# Patient Record
Sex: MALE | Race: BLACK OR AFRICAN AMERICAN | URBAN - METROPOLITAN AREA
[De-identification: names, ages, dates, MRNs, and addresses within clinical notes are randomized per-mention and may not be internally consistent; named-entity substitution may affect disease eponyms.]

---

## 2021-09-14 ENCOUNTER — OFFICE VISIT (OUTPATIENT)
Dept: PEDIATRICS CLINIC | Age: 66
End: 2021-09-14
Payer: MEDICARE

## 2021-09-14 VITALS
SYSTOLIC BLOOD PRESSURE: 100 MMHG | HEIGHT: 72 IN | TEMPERATURE: 97.4 F | DIASTOLIC BLOOD PRESSURE: 60 MMHG | HEART RATE: 67 BPM | BODY MASS INDEX: 17.34 KG/M2 | WEIGHT: 128 LBS

## 2021-09-14 DIAGNOSIS — S61.215A LACERATION OF LEFT RING FINGER WITHOUT FOREIGN BODY, NAIL DAMAGE STATUS UNSPECIFIED, INITIAL ENCOUNTER: Primary | ICD-10-CM

## 2021-09-14 PROBLEM — Z86.010 HISTORY OF COLONIC POLYPS: Status: ACTIVE | Noted: 2021-09-14

## 2021-09-14 PROBLEM — F10.20 OTHER AND UNSPECIFIED ALCOHOL DEPENDENCE, UNSPECIFIED DRINKING BEHAVIOR: Status: ACTIVE | Noted: 2021-09-14

## 2021-09-14 PROBLEM — E53.8 VITAMIN B12 DEFICIENCY (NON ANEMIC): Status: ACTIVE | Noted: 2021-09-14

## 2021-09-14 PROBLEM — F17.200 NICOTINE DEPENDENCE: Status: ACTIVE | Noted: 2021-09-14

## 2021-09-14 PROBLEM — D72.10 EOSINOPHILIA: Status: ACTIVE | Noted: 2021-09-14

## 2021-09-14 PROBLEM — I65.29 CAROTID ARTERY STENOSIS: Status: ACTIVE | Noted: 2021-09-14

## 2021-09-14 PROBLEM — E78.5 HYPERLIPIDEMIA: Status: ACTIVE | Noted: 2021-09-14

## 2021-09-14 PROBLEM — Z86.0100 HISTORY OF COLONIC POLYPS: Status: ACTIVE | Noted: 2021-09-14

## 2021-09-14 PROCEDURE — 99202 OFFICE O/P NEW SF 15 MIN: CPT | Performed by: NURSE PRACTITIONER

## 2021-09-14 ASSESSMENT — ENCOUNTER SYMPTOMS
WHEEZING: 0
SHORTNESS OF BREATH: 0
COUGH: 0

## 2021-10-21 ENCOUNTER — OFFICE VISIT (OUTPATIENT)
Dept: PEDIATRICS CLINIC | Age: 66
End: 2021-10-21
Payer: MEDICARE

## 2021-10-21 VITALS
HEIGHT: 72 IN | OXYGEN SATURATION: 93 % | SYSTOLIC BLOOD PRESSURE: 120 MMHG | WEIGHT: 124 LBS | TEMPERATURE: 96.4 F | BODY MASS INDEX: 16.8 KG/M2 | HEART RATE: 86 BPM | DIASTOLIC BLOOD PRESSURE: 70 MMHG

## 2021-10-21 DIAGNOSIS — R42 ORTHOSTATIC DIZZINESS: Primary | ICD-10-CM

## 2021-10-21 DIAGNOSIS — E78.5 HYPERLIPIDEMIA, UNSPECIFIED HYPERLIPIDEMIA TYPE: ICD-10-CM

## 2021-10-21 PROCEDURE — 99213 OFFICE O/P EST LOW 20 MIN: CPT | Performed by: NURSE PRACTITIONER

## 2021-11-05 ASSESSMENT — ENCOUNTER SYMPTOMS
ABDOMINAL PAIN: 0
COUGH: 0
SORE THROAT: 0
EYE PAIN: 0
VISUAL CHANGE: 0
CHEST TIGHTNESS: 0
NAUSEA: 0
EYE ITCHING: 0
SHORTNESS OF BREATH: 0
WHEEZING: 0
SWOLLEN GLANDS: 0
VOMITING: 0
EYE DISCHARGE: 0
CHANGE IN BOWEL HABIT: 0
RHINORRHEA: 0

## 2021-11-05 ASSESSMENT — VISUAL ACUITY: OU: 1

## 2021-11-05 NOTE — PROGRESS NOTES
Subjective:      Patient ID: Ryley Andino is a 73 y.o. male who present today with:      Chief Complaint   Patient presents with    Dizziness     Dizziness  This is a new problem. The current episode started 1 to 4 weeks ago. The problem occurs intermittently. The problem has been unchanged. Pertinent negatives include no abdominal pain, anorexia, arthralgias, change in bowel habit, chest pain, chills, congestion, coughing, diaphoresis, fatigue, fever, headaches, joint swelling, myalgias, nausea, neck pain, numbness, rash, sore throat, swollen glands, urinary symptoms, vertigo, visual change, vomiting or weakness. Exacerbated by: Changing positions: laying to sitting, sitting to standing. He has tried nothing for the symptoms. No past medical history on file. Patient Active Problem List    Diagnosis Date Noted    Carotid artery stenosis 09/14/2021    Eosinophilia 09/14/2021    History of colonic polyps 09/14/2021    Hyperlipidemia 09/14/2021    Nicotine dependence 09/14/2021    Other and unspecified alcohol dependence, unspecified drinking behavior 09/14/2021    Vitamin B12 deficiency (non anemic) 09/14/2021     No past surgical history on file.   Social History     Socioeconomic History    Marital status: Single     Spouse name: None    Number of children: None    Years of education: None    Highest education level: None   Occupational History    None   Tobacco Use    Smoking status: Current Every Day Smoker     Types: Cigarettes    Smokeless tobacco: Never Used   Substance and Sexual Activity    Alcohol use: None    Drug use: None    Sexual activity: None   Other Topics Concern    None   Social History Narrative    None     Social Determinants of Health     Financial Resource Strain:     Difficulty of Paying Living Expenses:    Food Insecurity:     Worried About Running Out of Food in the Last Year:     Haylee of Food in the Last Year:    Transportation Needs:     Lack of Transportation (Medical):  Lack of Transportation (Non-Medical):    Physical Activity:     Days of Exercise per Week:     Minutes of Exercise per Session:    Stress:     Feeling of Stress :    Social Connections:     Frequency of Communication with Friends and Family:     Frequency of Social Gatherings with Friends and Family:     Attends Caodaism Services:     Active Member of Clubs or Organizations:     Attends Club or Organization Meetings:     Marital Status:    Intimate Partner Violence:     Fear of Current or Ex-Partner:     Emotionally Abused:     Physically Abused:     Sexually Abused:      No current outpatient medications on file prior to visit. No current facility-administered medications on file prior to visit. No Known Allergies     Review of Systems   Constitutional: Negative for chills, diaphoresis, fatigue and fever. HENT: Negative for congestion, ear pain, mouth sores, rhinorrhea and sore throat. Eyes: Negative for pain, discharge and itching. Respiratory: Negative for cough, chest tightness, shortness of breath and wheezing. Cardiovascular: Negative for chest pain. Gastrointestinal: Negative for abdominal pain, anorexia, change in bowel habit, nausea and vomiting. Musculoskeletal: Negative for arthralgias, joint swelling, myalgias and neck pain. Skin: Negative for rash. Neurological: Positive for dizziness. Negative for vertigo, weakness, numbness and headaches. Objective:      Vitals:    10/21/21 1003   BP: 120/70   Site: Left Upper Arm   Position: Sitting   Cuff Size: Medium Adult   Pulse: 86   Temp: 96.4 °F (35.8 °C)   TempSrc: Temporal   SpO2: 93%   Weight: 124 lb (56.2 kg)   Height: 6' (1.829 m)     Physical Exam  Constitutional:       General: He is not in acute distress. Appearance: Normal appearance. He is not ill-appearing. HENT:      Head: Normocephalic and atraumatic.       Right Ear: Hearing, tympanic membrane, ear canal and external ear normal.      Left Ear: Hearing, tympanic membrane, ear canal and external ear normal.      Nose: Nose normal.      Right Sinus: No maxillary sinus tenderness or frontal sinus tenderness. Left Sinus: No maxillary sinus tenderness or frontal sinus tenderness. Mouth/Throat:      Lips: Pink. Mouth: Mucous membranes are moist.      Pharynx: Oropharynx is clear. Uvula midline. Tonsils: No tonsillar exudate. Eyes:      General: Lids are normal. Vision grossly intact. Extraocular Movements: Extraocular movements intact. Conjunctiva/sclera: Conjunctivae normal.      Pupils: Pupils are equal, round, and reactive to light. Cardiovascular:      Rate and Rhythm: Normal rate and regular rhythm. Heart sounds: Normal heart sounds. Pulmonary:      Effort: Pulmonary effort is normal.      Breath sounds: Normal breath sounds and air entry. Abdominal:      General: Abdomen is flat. Bowel sounds are normal.      Palpations: Abdomen is soft. Tenderness: There is no abdominal tenderness. There is no right CVA tenderness, left CVA tenderness, guarding or rebound. Musculoskeletal:      Comments: Passive and active ROM WNL. Lymphadenopathy:      Cervical: No cervical adenopathy. Skin:     General: Skin is warm and dry. Findings: No rash. Neurological:      General: No focal deficit present. Mental Status: He is alert. Deep Tendon Reflexes: Reflexes are normal and symmetric. Assessment & Plan:     Abel Robles was seen today for dizziness. Diagnoses and all orders for this visit:    Orthostatic dizziness  -     CBC With Auto Differential; Future  -     Comprehensive Metabolic Panel; Future  -     Vitamin B12 & Folate; Future  -     Iron And Tibc; Future  -     Ferritin; Future    Hyperlipidemia, unspecified hyperlipidemia type  -     Lipid Panel;  Future      Side effects, adverse effects of the medication prescribed today, as well as treatment plan/ rationale and result expectations have been discussed with the patient who expresses understanding and desires to proceed. Close follow up to evaluate treatment results and for coordination of care. I have reviewed the patient's medical history in detail and updated the computerized patient record. As always, patient is advised that if symptoms worsen in any way they will proceed to the nearest emergency room. Follow up in 1 month and as needed.     Angus Meza, APRN - CNP

## 2023-07-06 ENCOUNTER — APPOINTMENT (OUTPATIENT)
Dept: CT IMAGING | Age: 68
End: 2023-07-06
Payer: COMMERCIAL

## 2023-07-06 ENCOUNTER — APPOINTMENT (OUTPATIENT)
Dept: GENERAL RADIOLOGY | Age: 68
End: 2023-07-06
Payer: COMMERCIAL

## 2023-07-06 ENCOUNTER — HOSPITAL ENCOUNTER (OUTPATIENT)
Age: 68
Setting detail: OBSERVATION
Discharge: HOME | End: 2023-07-07
Attending: INTERNAL MEDICINE | Admitting: INTERNAL MEDICINE
Payer: COMMERCIAL

## 2023-07-06 DIAGNOSIS — D64.9 ANEMIA, UNSPECIFIED TYPE: ICD-10-CM

## 2023-07-06 DIAGNOSIS — E80.6 HYPERBILIRUBINEMIA: ICD-10-CM

## 2023-07-06 DIAGNOSIS — R94.31 ABNORMAL EKG: ICD-10-CM

## 2023-07-06 DIAGNOSIS — I95.1 ORTHOSTATIC HYPOTENSION: ICD-10-CM

## 2023-07-06 DIAGNOSIS — R55 SYNCOPE AND COLLAPSE: Primary | ICD-10-CM

## 2023-07-06 LAB
ALBUMIN SERPL-MCNC: 3.3 G/DL (ref 3.5–4.6)
ALBUMIN SERPL-MCNC: 3.4 G/DL (ref 3.5–4.6)
ALP SERPL-CCNC: 45 U/L (ref 35–104)
ALP SERPL-CCNC: 47 U/L (ref 35–104)
ALT SERPL-CCNC: 15 U/L (ref 0–41)
ALT SERPL-CCNC: 16 U/L (ref 0–41)
AMPHET UR QL SCN: NORMAL
ANION GAP SERPL CALCULATED.3IONS-SCNC: 13 MEQ/L (ref 9–15)
ANION GAP SERPL CALCULATED.3IONS-SCNC: 16 MEQ/L (ref 9–15)
ANISOCYTOSIS BLD QL SMEAR: ABNORMAL
APTT PPP: 26.1 SEC (ref 24.4–36.8)
AST SERPL-CCNC: 62 U/L (ref 0–40)
AST SERPL-CCNC: 71 U/L (ref 0–40)
BACTERIA URNS QL MICRO: NEGATIVE /HPF
BARBITURATES UR QL SCN: NORMAL
BASOPHILS # BLD: 0.1 K/UL (ref 0–0.2)
BASOPHILS NFR BLD: 1.1 %
BENZODIAZ UR QL SCN: NORMAL
BILIRUB SERPL-MCNC: 1.5 MG/DL (ref 0.2–0.7)
BILIRUB SERPL-MCNC: 1.6 MG/DL (ref 0.2–0.7)
BILIRUB UR QL STRIP: NEGATIVE
BUN SERPL-MCNC: 2 MG/DL (ref 8–23)
BUN SERPL-MCNC: <2 MG/DL (ref 8–23)
CALCIUM SERPL-MCNC: 8.9 MG/DL (ref 8.5–9.9)
CALCIUM SERPL-MCNC: 9.1 MG/DL (ref 8.5–9.9)
CANNABINOIDS UR QL SCN: NORMAL
CHLORIDE SERPL-SCNC: 91 MEQ/L (ref 95–107)
CHLORIDE SERPL-SCNC: 93 MEQ/L (ref 95–107)
CHP ED QC CHECK: NORMAL
CLARITY UR: CLEAR
CO2 SERPL-SCNC: 23 MEQ/L (ref 20–31)
CO2 SERPL-SCNC: 24 MEQ/L (ref 20–31)
COCAINE UR QL SCN: NORMAL
COLOR UR: YELLOW
CREAT SERPL-MCNC: 0.68 MG/DL (ref 0.7–1.2)
CREAT SERPL-MCNC: 0.71 MG/DL (ref 0.7–1.2)
DACRYOCYTES BLD QL SMEAR: ABNORMAL
DRUG SCREEN COMMENT UR-IMP: NORMAL
EOSINOPHIL # BLD: 0.2 K/UL (ref 0–0.7)
EOSINOPHIL NFR BLD: 4.6 %
EPI CELLS #/AREA URNS AUTO: NORMAL /HPF (ref 0–5)
ERYTHROCYTE [DISTWIDTH] IN BLOOD BY AUTOMATED COUNT: 15.6 % (ref 11.5–14.5)
ETHANOL PERCENT: NORMAL G/DL
ETHANOLAMINE SERPL-MCNC: <10 MG/DL (ref 0–0.08)
FENTANYL SCREEN, URINE: NORMAL
GLOBULIN SER CALC-MCNC: 3.2 G/DL (ref 2.3–3.5)
GLOBULIN SER CALC-MCNC: 3.2 G/DL (ref 2.3–3.5)
GLUCOSE BLD-MCNC: 115 MG/DL
GLUCOSE BLD-MCNC: 115 MG/DL (ref 70–99)
GLUCOSE SERPL-MCNC: 120 MG/DL (ref 70–99)
GLUCOSE SERPL-MCNC: 126 MG/DL (ref 70–99)
GLUCOSE UR STRIP-MCNC: NEGATIVE MG/DL
HCT VFR BLD AUTO: 40.2 % (ref 42–52)
HGB BLD-MCNC: 13.6 G/DL (ref 14–18)
HGB UR QL STRIP: NEGATIVE
HYALINE CASTS #/AREA URNS AUTO: NORMAL /HPF (ref 0–5)
INR PPP: 0.9
KETONES UR STRIP-MCNC: NEGATIVE MG/DL
LEUKOCYTE ESTERASE UR QL STRIP: ABNORMAL
LYMPHOCYTES # BLD: 1 K/UL (ref 1–4.8)
LYMPHOCYTES NFR BLD: 21.4 %
MACROCYTES BLD QL SMEAR: ABNORMAL
MAGNESIUM SERPL-MCNC: 1.6 MG/DL (ref 1.7–2.4)
MCH RBC QN AUTO: 40.4 PG (ref 27–31.3)
MCHC RBC AUTO-ENTMCNC: 33.8 % (ref 33–37)
MCV RBC AUTO: 119.2 FL (ref 79–92.2)
METHADONE UR QL SCN: NORMAL
MONOCYTES # BLD: 0.5 K/UL (ref 0.2–0.8)
MONOCYTES NFR BLD: 9.4 %
NEUTROPHILS # BLD: 3.1 K/UL (ref 1.4–6.5)
NEUTS SEG NFR BLD: 63.5 %
NITRITE UR QL STRIP: NEGATIVE
OPIATES UR QL SCN: NORMAL
OXYCODONE UR QL SCN: NORMAL
PCP UR QL SCN: NORMAL
PERFORMED ON: ABNORMAL
PH UR STRIP: 8 [PH] (ref 5–9)
PLATELET # BLD AUTO: 111 K/UL (ref 130–400)
PLATELET BLD QL SMEAR: ABNORMAL
POC CREATININE WHOLE BLOOD: 0.9
POIKILOCYTOSIS BLD QL SMEAR: ABNORMAL
POLYCHROMASIA BLD QL SMEAR: ABNORMAL
POTASSIUM SERPL-SCNC: 3.4 MEQ/L (ref 3.4–4.9)
POTASSIUM SERPL-SCNC: 3.6 MEQ/L (ref 3.4–4.9)
PROPOXYPH UR QL SCN: NORMAL
PROT SERPL-MCNC: 6.5 G/DL (ref 6.3–8)
PROT SERPL-MCNC: 6.6 G/DL (ref 6.3–8)
PROT UR STRIP-MCNC: NEGATIVE MG/DL
PROTHROMBIN TIME: 12.7 SEC (ref 12.3–14.9)
RBC # BLD AUTO: 3.37 M/UL (ref 4.7–6.1)
RBC #/AREA URNS AUTO: NORMAL /HPF (ref 0–5)
SLIDE REVIEW: ABNORMAL
SODIUM SERPL-SCNC: 130 MEQ/L (ref 135–144)
SODIUM SERPL-SCNC: 130 MEQ/L (ref 135–144)
SP GR UR STRIP: 1.02 (ref 1–1.03)
TARGETS BLD QL SMEAR: ABNORMAL
TROPONIN T SERPL-MCNC: <0.01 NG/ML (ref 0–0.01)
URINE REFLEX TO CULTURE: ABNORMAL
UROBILINOGEN UR STRIP-ACNC: 4 E.U./DL
WBC # BLD AUTO: 4.8 K/UL (ref 4.8–10.8)
WBC #/AREA URNS AUTO: NORMAL /HPF (ref 0–5)

## 2023-07-06 PROCEDURE — 96361 HYDRATE IV INFUSION ADD-ON: CPT

## 2023-07-06 PROCEDURE — 80053 COMPREHEN METABOLIC PANEL: CPT

## 2023-07-06 PROCEDURE — 6360000002 HC RX W HCPCS

## 2023-07-06 PROCEDURE — 6360000004 HC RX CONTRAST MEDICATION

## 2023-07-06 PROCEDURE — 70496 CT ANGIOGRAPHY HEAD: CPT

## 2023-07-06 PROCEDURE — 85610 PROTHROMBIN TIME: CPT

## 2023-07-06 PROCEDURE — 81001 URINALYSIS AUTO W/SCOPE: CPT

## 2023-07-06 PROCEDURE — 36415 COLL VENOUS BLD VENIPUNCTURE: CPT

## 2023-07-06 PROCEDURE — 83735 ASSAY OF MAGNESIUM: CPT

## 2023-07-06 PROCEDURE — 80307 DRUG TEST PRSMV CHEM ANLYZR: CPT

## 2023-07-06 PROCEDURE — 96365 THER/PROPH/DIAG IV INF INIT: CPT

## 2023-07-06 PROCEDURE — G0378 HOSPITAL OBSERVATION PER HR: HCPCS

## 2023-07-06 PROCEDURE — 85025 COMPLETE CBC W/AUTO DIFF WBC: CPT

## 2023-07-06 PROCEDURE — 70450 CT HEAD/BRAIN W/O DYE: CPT

## 2023-07-06 PROCEDURE — 82077 ASSAY SPEC XCP UR&BREATH IA: CPT

## 2023-07-06 PROCEDURE — 2580000003 HC RX 258

## 2023-07-06 PROCEDURE — 70498 CT ANGIOGRAPHY NECK: CPT

## 2023-07-06 PROCEDURE — 6830039000 HC L3 TRAUMA ALERT

## 2023-07-06 PROCEDURE — 99285 EMERGENCY DEPT VISIT HI MDM: CPT

## 2023-07-06 PROCEDURE — 93005 ELECTROCARDIOGRAM TRACING: CPT | Performed by: INTERNAL MEDICINE

## 2023-07-06 PROCEDURE — 85730 THROMBOPLASTIN TIME PARTIAL: CPT

## 2023-07-06 PROCEDURE — 84484 ASSAY OF TROPONIN QUANT: CPT

## 2023-07-06 PROCEDURE — 71045 X-RAY EXAM CHEST 1 VIEW: CPT

## 2023-07-06 RX ORDER — 0.9 % SODIUM CHLORIDE 0.9 %
500 INTRAVENOUS SOLUTION INTRAVENOUS ONCE
Status: COMPLETED | OUTPATIENT
Start: 2023-07-06 | End: 2023-07-06

## 2023-07-06 RX ORDER — MAGNESIUM SULFATE IN WATER 40 MG/ML
2000 INJECTION, SOLUTION INTRAVENOUS ONCE
Status: COMPLETED | OUTPATIENT
Start: 2023-07-06 | End: 2023-07-06

## 2023-07-06 RX ADMIN — MAGNESIUM SULFATE HEPTAHYDRATE 2000 MG: 40 INJECTION, SOLUTION INTRAVENOUS at 19:17

## 2023-07-06 RX ADMIN — IOPAMIDOL 75 ML: 612 INJECTION, SOLUTION INTRAVENOUS at 18:20

## 2023-07-06 RX ADMIN — SODIUM CHLORIDE 500 ML: 9 INJECTION, SOLUTION INTRAVENOUS at 17:55

## 2023-07-06 ASSESSMENT — PAIN - FUNCTIONAL ASSESSMENT: PAIN_FUNCTIONAL_ASSESSMENT: NONE - DENIES PAIN

## 2023-07-06 ASSESSMENT — ENCOUNTER SYMPTOMS
COUGH: 0
SHORTNESS OF BREATH: 0
EYE PAIN: 0
BACK PAIN: 0
DIARRHEA: 0
PHOTOPHOBIA: 0
VOMITING: 0
ABDOMINAL PAIN: 0
CHEST TIGHTNESS: 0
NAUSEA: 0

## 2023-07-06 ASSESSMENT — LIFESTYLE VARIABLES
HOW OFTEN DO YOU HAVE A DRINK CONTAINING ALCOHOL: NEVER
HOW MANY STANDARD DRINKS CONTAINING ALCOHOL DO YOU HAVE ON A TYPICAL DAY: PATIENT DOES NOT DRINK

## 2023-07-06 NOTE — ED PROVIDER NOTES
[facility] ED  eMERGENCY dEPARTMENT eNCOUnter      Pt Name: Adis Arellano  MRN: [id number]  [address] [facility] [place] 1955  Date of evaluation: 7/6/2023  Provider: ZORA Valdez        HISTORY OF PRESENT ILLNESS    Adis Arellano is a 80 y.o. male per chart review has ah/o carotid artery stenosis, hyperlipidemia, tobacco abuse. Patient presents to the emergency department for evaluation of syncopal event. Patient states he was out shopping, he suddenly felt lightheaded and warm, next thing he knew he passed out. He tells me he did not hit his head from what he knows. He denies any posttraumatic pain or injury complaints. States he was able to get up at the scene and transported here by EMS. States he had a stable steady gait there. Patient states he had a similar syncopal event approximately 1 month ago, states he was not evaluated at that time. Patient denies any complaints at present. No headache, nausea, vomiting, dizziness, focal weakness or numbness, facial asymmetry, dysarthria, arthralgias, lightheadedness, diaphoresis, seizure-like activity, incontinence, neck or back pain, chest pains or shortness of breath. Patient denies any recent illness complaints or episodes. Denies decreased oral intake. Note patient has history of carotid artery stenosis on his chart from an external facility, I reviewed this with the patient he states he is not familiar with this diagnosis. No history of intervention for stenosis. He denies any CAD history. REVIEW OF SYSTEMS       Review of Systems   Constitutional:  Negative for chills and fever. HENT:  Negative for congestion. Eyes:  Negative for photophobia, pain and visual disturbance. Respiratory:  Negative for cough, chest tightness and shortness of breath. Cardiovascular:  Negative for chest pain. Gastrointestinal:  Negative for abdominal pain, diarrhea, nausea and vomiting. Genitourinary:  Negative for difficulty urinating.

## 2023-07-06 NOTE — ED TRIAGE NOTES
Pt was walking in a store when he became lightheaded and warm prior to passing out. Pt fell to the fall, denies hitting his head or any other injuries. Pt states this happened 1 month ago but wasn't checked by a medical professional.  Pt's breathing is unlabored with equal rise, pulses and circulation is good.

## 2023-07-06 NOTE — ED NOTES
Pt's SBP dropped 18 points from lying to standing but the pt denied any dizziness.      Roman Martinez RN  07/06/23 1929

## 2023-07-07 VITALS
TEMPERATURE: 98.1 F | SYSTOLIC BLOOD PRESSURE: 103 MMHG | BODY MASS INDEX: 18.96 KG/M2 | HEIGHT: 72 IN | OXYGEN SATURATION: 97 % | HEART RATE: 66 BPM | RESPIRATION RATE: 18 BRPM | DIASTOLIC BLOOD PRESSURE: 74 MMHG | WEIGHT: 140 LBS

## 2023-07-07 LAB
EKG ATRIAL RATE: 67 BPM
EKG P AXIS: 64 DEGREES
EKG P-R INTERVAL: 98 MS
EKG Q-T INTERVAL: 408 MS
EKG QRS DURATION: 78 MS
EKG QTC CALCULATION (BAZETT): 431 MS
EKG R AXIS: 39 DEGREES
EKG T AXIS: 56 DEGREES
EKG VENTRICULAR RATE: 67 BPM
FOLATE: 3.4 NG/ML
LACTATE BLDV-SCNC: 1.4 MMOL/L (ref 0.5–2.2)
TROPONIN T SERPL-MCNC: <0.01 NG/ML (ref 0–0.01)
VITAMIN B-12: 257 PG/ML (ref 232–1245)

## 2023-07-07 PROCEDURE — 82746 ASSAY OF FOLIC ACID SERUM: CPT

## 2023-07-07 PROCEDURE — 93010 ELECTROCARDIOGRAM REPORT: CPT | Performed by: INTERNAL MEDICINE

## 2023-07-07 PROCEDURE — 2580000003 HC RX 258: Performed by: INTERNAL MEDICINE

## 2023-07-07 PROCEDURE — G0378 HOSPITAL OBSERVATION PER HR: HCPCS

## 2023-07-07 PROCEDURE — 36415 COLL VENOUS BLD VENIPUNCTURE: CPT

## 2023-07-07 PROCEDURE — 96372 THER/PROPH/DIAG INJ SC/IM: CPT

## 2023-07-07 PROCEDURE — 96361 HYDRATE IV INFUSION ADD-ON: CPT

## 2023-07-07 PROCEDURE — 83605 ASSAY OF LACTIC ACID: CPT

## 2023-07-07 PROCEDURE — 82607 VITAMIN B-12: CPT

## 2023-07-07 PROCEDURE — 2580000003 HC RX 258

## 2023-07-07 PROCEDURE — 6360000002 HC RX W HCPCS: Performed by: INTERNAL MEDICINE

## 2023-07-07 PROCEDURE — 84484 ASSAY OF TROPONIN QUANT: CPT

## 2023-07-07 RX ORDER — SODIUM CHLORIDE 0.9 % (FLUSH) 0.9 %
5-40 SYRINGE (ML) INJECTION PRN
Status: DISCONTINUED | OUTPATIENT
Start: 2023-07-07 | End: 2023-07-07 | Stop reason: HOSPADM

## 2023-07-07 RX ORDER — ACETAMINOPHEN 650 MG/1
650 SUPPOSITORY RECTAL EVERY 6 HOURS PRN
Status: DISCONTINUED | OUTPATIENT
Start: 2023-07-07 | End: 2023-07-07 | Stop reason: HOSPADM

## 2023-07-07 RX ORDER — POLYETHYLENE GLYCOL 3350 17 G/17G
17 POWDER, FOR SOLUTION ORAL DAILY PRN
Status: DISCONTINUED | OUTPATIENT
Start: 2023-07-07 | End: 2023-07-07 | Stop reason: HOSPADM

## 2023-07-07 RX ORDER — SODIUM CHLORIDE 9 MG/ML
INJECTION, SOLUTION INTRAVENOUS PRN
Status: DISCONTINUED | OUTPATIENT
Start: 2023-07-07 | End: 2023-07-07 | Stop reason: HOSPADM

## 2023-07-07 RX ORDER — ACETAMINOPHEN 325 MG/1
650 TABLET ORAL EVERY 6 HOURS PRN
Status: DISCONTINUED | OUTPATIENT
Start: 2023-07-07 | End: 2023-07-07 | Stop reason: HOSPADM

## 2023-07-07 RX ORDER — SODIUM CHLORIDE 9 MG/ML
INJECTION, SOLUTION INTRAVENOUS
Status: COMPLETED
Start: 2023-07-07 | End: 2023-07-07

## 2023-07-07 RX ORDER — ENOXAPARIN SODIUM 100 MG/ML
40 INJECTION SUBCUTANEOUS DAILY
Status: DISCONTINUED | OUTPATIENT
Start: 2023-07-07 | End: 2023-07-07 | Stop reason: HOSPADM

## 2023-07-07 RX ORDER — SODIUM CHLORIDE 9 MG/ML
INJECTION, SOLUTION INTRAVENOUS CONTINUOUS
Status: DISCONTINUED | OUTPATIENT
Start: 2023-07-07 | End: 2023-07-07 | Stop reason: HOSPADM

## 2023-07-07 RX ORDER — ONDANSETRON 2 MG/ML
4 INJECTION INTRAMUSCULAR; INTRAVENOUS EVERY 6 HOURS PRN
Status: DISCONTINUED | OUTPATIENT
Start: 2023-07-07 | End: 2023-07-07 | Stop reason: HOSPADM

## 2023-07-07 RX ORDER — ONDANSETRON 4 MG/1
4 TABLET, ORALLY DISINTEGRATING ORAL EVERY 8 HOURS PRN
Status: DISCONTINUED | OUTPATIENT
Start: 2023-07-07 | End: 2023-07-07 | Stop reason: HOSPADM

## 2023-07-07 RX ORDER — SODIUM CHLORIDE 0.9 % (FLUSH) 0.9 %
5-40 SYRINGE (ML) INJECTION EVERY 12 HOURS SCHEDULED
Status: DISCONTINUED | OUTPATIENT
Start: 2023-07-07 | End: 2023-07-07 | Stop reason: HOSPADM

## 2023-07-07 RX ADMIN — SODIUM CHLORIDE 1000 ML: 9 INJECTION, SOLUTION INTRAVENOUS at 06:16

## 2023-07-07 RX ADMIN — SODIUM CHLORIDE, PRESERVATIVE FREE 10 ML: 5 INJECTION INTRAVENOUS at 07:44

## 2023-07-07 RX ADMIN — ENOXAPARIN SODIUM 40 MG: 100 INJECTION SUBCUTANEOUS at 08:00

## 2023-07-07 NOTE — FLOWSHEET NOTE
Nursing report received from [address] [place] [address], Awaiting patient arrival.     Bettye Sarmiento RN  7/6/2023 2112

## 2023-07-07 NOTE — DISCHARGE INSTR - DIET
Good nutrition is important when healing from an illness, injury, or surgery. Follow any nutrition recommendations given to you during your hospital stay. If you were given an oral nutrition supplement while in the hospital, continue to take this supplement at home. You can take it with meals, in-between meals, and/or before bedtime. These supplements can be purchased at most local grocery stores, pharmacies, and chain [employer]stores. If you have any questions about your diet or nutrition, call the hospital and ask for the dietitian. Low fat, low salt, heart healthy diet.

## 2023-07-07 NOTE — PROGRESS NOTES
1630 PM Patient given discharge instructions; verbalized understanding of appropriate dehydration fluids to drink at home as well as follow up care. No new medications. Follow up to be scheduled per patient. No questions or concerns voiced. NCP deleted. Patient family member here to pick patient up. No complaints of dizziness. Will d/c as ordered.

## 2023-07-07 NOTE — H&P
BRAIN/VENTRICLES:  No acute intracranial hemorrhage or extraaxial fluid collection. Grey-white differentiation is maintained. No evidence of mass, mass effect or midline shift. No evidence of hydrocephalus. Mild chronic small vessel ischemic disease and mild atrophy. ORBITS: The visualized portion of the orbits demonstrate no acute abnormality. SINUSES:  The visualized paranasal sinuses and mastoid air cells demonstrate no acute abnormality. SOFT TISSUES/SKULL: No acute abnormality of the visualized skull or soft tissues. CTA NECK: AORTIC ARCH/ARCH VESSELS: No dissection or arterial injury. No significant stenosis of the brachiocephalic or subclavian arteries. CAROTID ARTERIES: Atherosclerosis in carotid bifurcations. No dissection, arterial injury, or hemodynamically significant stenosis by NASCET criteria. VERTEBRAL ARTERIES: No dissection, arterial injury, or significant stenosis. SOFT TISSUES: The lung apices are clear. No cervical or superior mediastinal lymphadenopathy. The larynx and pharynx are unremarkable. No acute abnormality of the salivary and thyroid glands. BONES: No acute osseous abnormality. CTA HEAD: ANTERIOR CIRCULATION: No significant stenosis of the intracranial internal carotid, anterior cerebral, or middle cerebral arteries. No aneurysm. POSTERIOR CIRCULATION: No significant stenosis of the vertebral, basilar, or posterior cerebral arteries. No aneurysm. OTHER: No dural venous sinus thrombosis on this non-dedicated study. 1. Age related involutional changes with no acute intracranial abnormality. 2. No flow-limiting arterial stenosis in the head and neck. CT HEAD WO CONTRAST    Result Date: 7/6/2023  EXAMINATION: CTA OF THE NECK; CTA OF THE HEAD WITHOUT AND WITH CONTRAST; CT OF THE HEAD WITHOUT CONTRAST 7/6/2023 6:20 pm: TECHNIQUE: CTA of the neck was performed with the administration of intravenous contrast. Multiplanar reformatted images are provided for review.   MIP images

## 2023-07-07 NOTE — DISCHARGE INSTRUCTIONS
Follow up with primary care physician in the next 7 days or sooner if needed. If you do not have a Primary care physician, please schedule an appointment with one. Please ask prior to discharge about a list of local providers. Please return to ER or call 911 if you develop any significant signs or symptoms. I may not have addressed all of your medical illnesses or the abnormal blood work or imaging therefore please ask your PCP to obtain [facility] record to follow up on all of the abnormal labs, imaging and findings that I have and have not addressed during your hospitalization. Discharging you from the hospital does not mean that your medical care ends here and now. You may still need additional work up, investigation, monitoring, and treatment to be handled from this point on by outside providers including your PCP, Specialists and other healthcare providers. For medication questions, contact your retail pharmacy and your PCP. Your medical team at [facility] ([facility]) appreciates the opportunity to work with you to get well!     Becky Willams, DO  1:36 PM

## 2023-07-07 NOTE — DISCHARGE SUMMARY
oriented, thought content appropriate, normal insight  Capillary Refill: Brisk,< 3 seconds   Peripheral Pulses: +2 palpable, equal bilaterally     Patient was seen by the following consultants   Consults:  IP CONSULT TO SPIRITUAL SERVICES    Significant Diagnostic Studies:    Refer to chart     Please refer to chart if no studies are shown here    CTA HEAD W WO CONTRAST    Result Date: 7/6/2023  EXAMINATION: CTA OF THE NECK; CTA OF THE HEAD WITHOUT AND WITH CONTRAST; CT OF THE HEAD WITHOUT CONTRAST 7/6/2023 6:20 pm: TECHNIQUE: CTA of the neck was performed with the administration of intravenous contrast. Multiplanar reformatted images are provided for review. MIP images are provided for review. Stenosis of the internal carotid arteries measured using NASCET criteria. Automated exposure control, iterative reconstruction, and/or weight based adjustment of the mA/kV was utilized to reduce the radiation dose to as low as reasonably achievable.; CTA of the head/brain was performed without and with the administration of intravenous contrast. Multiplanar reformatted images are provided for review. MIP images are provided for review. Automated exposure control, iterative reconstruction, and/or weight based adjustment of the mA/kV was utilized to reduce the radiation dose to as low as reasonably achievable.; CT of the head was performed without the administration of intravenous contrast. Automated exposure control, iterative reconstruction, and/or weight based adjustment of the mA/kV was utilized to reduce the radiation dose to as low as reasonably achievable. Noncontrast CT of the head with reconstructed 2-D images are also provided for review. COMPARISON: None. HISTORY: ORDERING SYSTEM PROVIDED HISTORY: syncope; hx carotid stenosis FINDINGS: CT HEAD: BRAIN/VENTRICLES:  No acute intracranial hemorrhage or extraaxial fluid collection. Grey-white differentiation is maintained.   No evidence of mass, mass effect or midline

## 2023-07-07 NOTE — ACP (ADVANCE CARE PLANNING)
Advance Care Planning   Healthcare Decision Maker:    Primary Decision Maker: Vicenta Kussmaul - Brother/Sister - [phone]    Secondary Decision Maker: Bonilla Maxwelton - [facility] - [phone]    Click here to complete Healthcare Decision Makers including selection of the Healthcare Decision Maker Relationship (ie \"Primary\").

## 2023-07-07 NOTE — CARE COORDINATION
Case Management Assessment  Initial Evaluation    Date/Time of Evaluation: 7/7/2023 11:42 AM  Assessment Completed by: Rainer Giron    If patient is discharged prior to next notation, then this note serves as note for discharge by case management. Patient Name: Vandana Cortes                   YOB: 1955  Diagnosis: Orthostatic hypotension [I95.1]  Syncope and collapse [R55]  Hyperbilirubinemia [E80.6]  Abnormal EKG [R94.31]  Anemia, unspecified type [D64.9]                   Date / Time: 7/6/2023  4:57 PM    Patient Admission Status: Observation   Readmission Risk (Low < 19, Mod (19-27), High > 27): No data recorded  Current PCP: EMILIA Peters CNP  PCP verified by CM? Yes (Pt has [place] doctor and PCP list given)    Chart Reviewed: Yes      History Provided by: Patient  Patient Orientation: Alert and Oriented, Person, Place, Situation, Self    Patient Cognition: Alert    Hospitalization in the last 30 days (Readmission):  No    If yes, Readmission Assessment in  Navigator will be completed. Advance Directives:      Code Status: Full Code   Patient's Primary Decision Maker is: Named in [address] [place] [address]    Primary Decision Maker: Ken Mcdonnell - Brother/Sister - [phone]    Secondary Decision Maker: Simba Moraes - [place] - [phone]    Discharge Planning:    Patient lives with: Alone Type of Home: House  Primary Care Giver: Self  Patient Support Systems include: Family Members   Current Financial resources:    Current community resources:    Current services prior to admission: None            Current DME:              Type of Home Care services:  None    ADLS  Prior functional level: Independent in ADLs/IADLs, Other (see comment) (Pt states he lives alone and does his own ADL's and denies needs.)  Current functional level: Other (see comment) (Pt has not been up yet today.  He may need PT/OT isreal.)    PT AM-PAC:   /24  OT AM-PAC:   /24    Family can provide assistance

## 2023-07-07 NOTE — ACP (ADVANCE CARE PLANNING)
Advance Care Planning     Advance Care Planning Inpatient Note  [facility] Department    Today's Date: 7/7/2023  Unit: MLOZ 1W TELEMETRY    Received request from IDT Member. Upon review of chart and communication with care team, patient's decision making abilities are not in question. . Patient was/were present in the room during visit. Goals of ACP Conversation:  Discuss advance care planning documents    Health Care Decision Makers:       Primary Decision Maker: Tommy Herrera - Brother/Sister - [phone]    Secondary Decision Maker: Siomara Nj - Brother/Sister - [phone]  Summary:  Completed New Documents  Updated Healthcare Decision Maker    Advance Care Planning Documents (Patient Wishes):  Healthcare Power of /Advance Directive Appointment of Health Care Agent     Assessment:  Your patient was resting in bed. He appeared comfortable but anxious to be discharged. We completed his [address] document. The patient named his brother Tommy Herrera as his primary medical decision maker. A copy was placed in his chart for scanning into Epic. The original was returned to the patient. Interventions:  Provided education on documents for clarity and greater understanding  Assisted in the completion of documents according to patient's wishes at this time  Encouraged ongoing ACP conversation with future decision makers and loved ones    Care Preferences Communicated:   No    Outcomes/Plan:  New advance directive completed. Returned original document(s) to patient, as well as copies for distribution to appointed agents  Copy of advance directive given to staff to scan into medical record. Routed ACP note to attending provider or other IDT member.     Electronically signed by Oren [facility] on 7/7/2023 at 1:37 PM

## 2023-07-09 LAB
PERFORMED ON: NORMAL
POC CREATININE: 0.9 MG/DL (ref 0.8–1.3)
POC SAMPLE TYPE: NORMAL

## 2023-08-08 ENCOUNTER — APPOINTMENT (OUTPATIENT)
Dept: GENERAL RADIOLOGY | Age: 68
End: 2023-08-08
Payer: COMMERCIAL

## 2023-08-08 ENCOUNTER — HOSPITAL ENCOUNTER (EMERGENCY)
Age: 68
Discharge: HOME | End: 2023-08-08
Attending: EMERGENCY MEDICINE
Payer: COMMERCIAL

## 2023-08-08 ENCOUNTER — APPOINTMENT (OUTPATIENT)
Dept: CT IMAGING | Age: 68
End: 2023-08-08
Payer: COMMERCIAL

## 2023-08-08 VITALS
HEART RATE: 82 BPM | SYSTOLIC BLOOD PRESSURE: 122 MMHG | BODY MASS INDEX: 19.23 KG/M2 | WEIGHT: 142 LBS | OXYGEN SATURATION: 95 % | DIASTOLIC BLOOD PRESSURE: 71 MMHG | TEMPERATURE: 97.7 F | HEIGHT: 72 IN | RESPIRATION RATE: 31 BRPM

## 2023-08-08 DIAGNOSIS — Z53.29 LEFT AGAINST MEDICAL ADVICE: ICD-10-CM

## 2023-08-08 DIAGNOSIS — N39.0 URINARY TRACT INFECTION WITH HEMATURIA, SITE UNSPECIFIED: ICD-10-CM

## 2023-08-08 DIAGNOSIS — R55 SYNCOPE AND COLLAPSE: Primary | ICD-10-CM

## 2023-08-08 DIAGNOSIS — R31.9 URINARY TRACT INFECTION WITH HEMATURIA, SITE UNSPECIFIED: ICD-10-CM

## 2023-08-08 LAB
ALBUMIN SERPL-MCNC: 2.9 G/DL (ref 3.5–4.6)
ALP SERPL-CCNC: 31 U/L (ref 35–104)
ALT SERPL-CCNC: <5 U/L (ref 0–41)
ANION GAP SERPL CALCULATED.3IONS-SCNC: 9 MEQ/L (ref 9–15)
AST SERPL-CCNC: 13 U/L (ref 0–40)
BACTERIA URNS QL MICRO: ABNORMAL /HPF
BASOPHILS # BLD: 0 K/UL (ref 0–0.2)
BASOPHILS NFR BLD: 0.4 %
BILIRUB SERPL-MCNC: 0.4 MG/DL (ref 0.2–0.7)
BILIRUB UR QL STRIP: NEGATIVE
BUN SERPL-MCNC: 7 MG/DL (ref 8–23)
CALCIUM SERPL-MCNC: 8.5 MG/DL (ref 8.5–9.9)
CHLORIDE SERPL-SCNC: 112 MEQ/L (ref 95–107)
CLARITY UR: CLEAR
CO2 SERPL-SCNC: 21 MEQ/L (ref 20–31)
COLOR UR: YELLOW
CREAT SERPL-MCNC: 0.88 MG/DL (ref 0.7–1.2)
EOSINOPHIL # BLD: 0.6 K/UL (ref 0–0.7)
EOSINOPHIL NFR BLD: 8.3 %
EPI CELLS #/AREA URNS AUTO: ABNORMAL /HPF (ref 0–5)
ERYTHROCYTE [DISTWIDTH] IN BLOOD BY AUTOMATED COUNT: 16.2 % (ref 11.5–14.5)
GLOBULIN SER CALC-MCNC: 2.9 G/DL (ref 2.3–3.5)
GLUCOSE SERPL-MCNC: 97 MG/DL (ref 70–99)
GLUCOSE UR STRIP-MCNC: NEGATIVE MG/DL
HCT VFR BLD AUTO: 37.2 % (ref 42–52)
HGB BLD-MCNC: 12.3 G/DL (ref 14–18)
HGB UR QL STRIP: NEGATIVE
HYALINE CASTS #/AREA URNS AUTO: ABNORMAL /HPF (ref 0–5)
KETONES UR STRIP-MCNC: NEGATIVE MG/DL
LEUKOCYTE ESTERASE UR QL STRIP: ABNORMAL
LIPASE SERPL-CCNC: 30 U/L (ref 12–95)
LYMPHOCYTES # BLD: 2.1 K/UL (ref 1–4.8)
LYMPHOCYTES NFR BLD: 26.6 %
MAGNESIUM SERPL-MCNC: 1.7 MG/DL (ref 1.7–2.4)
MCH RBC QN AUTO: 36.9 PG (ref 27–31.3)
MCHC RBC AUTO-ENTMCNC: 33.1 % (ref 33–37)
MCV RBC AUTO: 111.4 FL (ref 79–92.2)
MONOCYTES # BLD: 0.6 K/UL (ref 0.2–0.8)
MONOCYTES NFR BLD: 7.5 %
NEUTROPHILS # BLD: 4.4 K/UL (ref 1.4–6.5)
NEUTS SEG NFR BLD: 57.2 %
NITRITE UR QL STRIP: NEGATIVE
PH UR STRIP: 7 [PH] (ref 5–9)
PLATELET # BLD AUTO: 263 K/UL (ref 130–400)
POTASSIUM SERPL-SCNC: 4 MEQ/L (ref 3.4–4.9)
PROT SERPL-MCNC: 5.8 G/DL (ref 6.3–8)
PROT UR STRIP-MCNC: NEGATIVE MG/DL
RBC # BLD AUTO: 3.34 M/UL (ref 4.7–6.1)
RBC #/AREA URNS AUTO: ABNORMAL /HPF (ref 0–5)
SODIUM SERPL-SCNC: 142 MEQ/L (ref 135–144)
SP GR UR STRIP: 1.02 (ref 1–1.03)
T4 FREE SERPL-MCNC: 0.77 NG/DL (ref 0.84–1.68)
TROPONIN T SERPL-MCNC: <0.01 NG/ML (ref 0–0.01)
TSH SERPL-MCNC: 3.41 UIU/ML (ref 0.44–3.86)
UROBILINOGEN UR STRIP-ACNC: 0.2 E.U./DL
WBC # BLD AUTO: 7.7 K/UL (ref 4.8–10.8)
WBC #/AREA URNS AUTO: >100 /HPF (ref 0–5)

## 2023-08-08 PROCEDURE — 36415 COLL VENOUS BLD VENIPUNCTURE: CPT

## 2023-08-08 PROCEDURE — 99285 EMERGENCY DEPT VISIT HI MDM: CPT

## 2023-08-08 PROCEDURE — 93005 ELECTROCARDIOGRAM TRACING: CPT | Performed by: EMERGENCY MEDICINE

## 2023-08-08 PROCEDURE — 84439 ASSAY OF FREE THYROXINE: CPT

## 2023-08-08 PROCEDURE — 70450 CT HEAD/BRAIN W/O DYE: CPT

## 2023-08-08 PROCEDURE — 84443 ASSAY THYROID STIM HORMONE: CPT

## 2023-08-08 PROCEDURE — 2580000003 HC RX 258: Performed by: EMERGENCY MEDICINE

## 2023-08-08 PROCEDURE — 81001 URINALYSIS AUTO W/SCOPE: CPT

## 2023-08-08 PROCEDURE — 83735 ASSAY OF MAGNESIUM: CPT

## 2023-08-08 PROCEDURE — 84484 ASSAY OF TROPONIN QUANT: CPT

## 2023-08-08 PROCEDURE — 83690 ASSAY OF LIPASE: CPT

## 2023-08-08 PROCEDURE — 71045 X-RAY EXAM CHEST 1 VIEW: CPT

## 2023-08-08 PROCEDURE — 80053 COMPREHEN METABOLIC PANEL: CPT

## 2023-08-08 PROCEDURE — 85025 COMPLETE CBC W/AUTO DIFF WBC: CPT

## 2023-08-08 RX ORDER — CEPHALEXIN 500 MG/1
500 CAPSULE ORAL 3 TIMES DAILY
Qty: 14 CAPSULE | Refills: 0 | Status: SHIPPED | OUTPATIENT
Start: 2023-08-08 | End: 2023-08-15

## 2023-08-08 RX ORDER — 0.9 % SODIUM CHLORIDE 0.9 %
1000 INTRAVENOUS SOLUTION INTRAVENOUS ONCE
Status: COMPLETED | OUTPATIENT
Start: 2023-08-08 | End: 2023-08-08

## 2023-08-08 RX ADMIN — SODIUM CHLORIDE 1000 ML: 9 INJECTION, SOLUTION INTRAVENOUS at 13:42

## 2023-08-08 ASSESSMENT — PAIN - FUNCTIONAL ASSESSMENT: PAIN_FUNCTIONAL_ASSESSMENT: NONE - DENIES PAIN
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
## 2023-08-08 NOTE — ED NOTES
PT request to be discharged home  Dr Mickiel Harada at the bedside  PT signed [facility] documents  Denies concerns     Sharonda Ruiz RN  08/08/23 9450

## 2023-08-08 NOTE — ED PROVIDER NOTES
[facility] ED  eMERGENCY dEPARTMENT eNCOUnter      Pt Name: Johnny Ugalde  MRN: [id number]  [address] [facility] 1955  Date of evaluation: 8/8/2023  Provider: Sarah Villa MD    CHIEF COMPLAINT     Patient arrives by ambulance with history of syncopal event. HISTORY OF PRESENT ILLNESS   (Location/Symptom, Timing/Onset,Context/Setting, Quality, Duration, Modifying Factors, Severity)  Note limiting factors. Johnny Ugalde is a 80 y.o. male who presents to the emergency department with syncopal event while sitting down today. The patient was sitting and he passed out. He did vomit. Patient denies any headache blurry double vision nausea vomiting chest pain shortness of breath numbness or tingling in the arms or legs neck pain or back pain rash heat or cold intolerance. Patient states he feels his normal self. Patient states he was sitting down he had just eaten a sandwich when he had this episode. Patient has had a similar episode about a month ago was admitted for this and evaluated and he states that they did not find anything wrong with him and up until today he was fine. HPI    NursingNotes were reviewed. REVIEW OF SYSTEMS    (2-9 systems for level 4, 10 or more for level 5)     Review of Systems   All other systems reviewed and are negative. Except as noted above the remainder of the review of systems was reviewed and negative. PAST MEDICAL HISTORY   History reviewed. No pertinent past medical history. SURGICALHISTORY     History reviewed. No pertinent surgical history. CURRENT MEDICATIONS       There are no discharge medications for this patient. ALLERGIES     Patient has no known allergies. FAMILY HISTORY     History reviewed. No pertinent family history.        SOCIAL HISTORY       Social History     Socioeconomic History    Marital status: Single     Spouse name: None    Number of children: None    Years of education: None    Highest education level: None

## 2023-08-09 LAB
EKG ATRIAL RATE: 71 BPM
EKG P AXIS: 77 DEGREES
EKG P-R INTERVAL: 128 MS
EKG Q-T INTERVAL: 434 MS
EKG QRS DURATION: 78 MS
EKG QTC CALCULATION (BAZETT): 471 MS
EKG R AXIS: 25 DEGREES
EKG T AXIS: 48 DEGREES
EKG VENTRICULAR RATE: 71 BPM

## 2023-08-12 LAB
EKG ATRIAL RATE: 79 BPM
EKG P AXIS: 76 DEGREES
EKG P-R INTERVAL: 128 MS
EKG Q-T INTERVAL: 440 MS
EKG QRS DURATION: 78 MS
EKG QTC CALCULATION (BAZETT): 504 MS
EKG R AXIS: 30 DEGREES
EKG T AXIS: 13 DEGREES
EKG VENTRICULAR RATE: 79 BPM

## 2023-08-25 ENCOUNTER — OFFICE VISIT (OUTPATIENT)
Dept: FAMILY MEDICINE CLINIC | Age: 68
End: 2023-08-25
Payer: COMMERCIAL

## 2023-08-25 VITALS
DIASTOLIC BLOOD PRESSURE: 68 MMHG | BODY MASS INDEX: 16.66 KG/M2 | HEIGHT: 72 IN | TEMPERATURE: 96.1 F | HEART RATE: 70 BPM | SYSTOLIC BLOOD PRESSURE: 100 MMHG | RESPIRATION RATE: 16 BRPM | OXYGEN SATURATION: 100 % | WEIGHT: 123 LBS

## 2023-08-25 DIAGNOSIS — N30.00 ACUTE CYSTITIS WITHOUT HEMATURIA: ICD-10-CM

## 2023-08-25 DIAGNOSIS — D64.9 ANEMIA, UNSPECIFIED TYPE: ICD-10-CM

## 2023-08-25 DIAGNOSIS — Z13.1 SCREENING FOR DIABETES MELLITUS (DM): ICD-10-CM

## 2023-08-25 DIAGNOSIS — Z12.12 SCREENING FOR COLORECTAL CANCER: ICD-10-CM

## 2023-08-25 DIAGNOSIS — F10.11 ALCOHOL ABUSE, IN REMISSION: ICD-10-CM

## 2023-08-25 DIAGNOSIS — E78.5 HYPERLIPIDEMIA, UNSPECIFIED HYPERLIPIDEMIA TYPE: ICD-10-CM

## 2023-08-25 DIAGNOSIS — R55 SYNCOPE AND COLLAPSE: ICD-10-CM

## 2023-08-25 DIAGNOSIS — R63.6 SEVERELY UNDERWEIGHT ADULT: ICD-10-CM

## 2023-08-25 DIAGNOSIS — E78.5 HYPERLIPIDEMIA, UNSPECIFIED HYPERLIPIDEMIA TYPE: Primary | ICD-10-CM

## 2023-08-25 DIAGNOSIS — R63.6 UNDERWEIGHT DUE TO INADEQUATE CALORIC INTAKE: ICD-10-CM

## 2023-08-25 DIAGNOSIS — Z12.11 SCREENING FOR COLORECTAL CANCER: ICD-10-CM

## 2023-08-25 LAB
ALBUMIN SERPL-MCNC: 3.9 G/DL (ref 3.5–4.6)
ALP SERPL-CCNC: 40 U/L (ref 35–104)
ALT SERPL-CCNC: 6 U/L (ref 0–41)
ANION GAP SERPL CALCULATED.3IONS-SCNC: 11 MEQ/L (ref 9–15)
AST SERPL-CCNC: 15 U/L (ref 0–40)
BASOPHILS # BLD: 0 K/UL (ref 0–0.2)
BASOPHILS NFR BLD: 0.2 %
BILIRUB SERPL-MCNC: 0.4 MG/DL (ref 0.2–0.7)
BUN SERPL-MCNC: 11 MG/DL (ref 8–23)
CALCIUM SERPL-MCNC: 9.8 MG/DL (ref 8.5–9.9)
CHLORIDE SERPL-SCNC: 100 MEQ/L (ref 95–107)
CHOLEST SERPL-MCNC: 263 MG/DL (ref 0–199)
CO2 SERPL-SCNC: 24 MEQ/L (ref 20–31)
CREAT SERPL-MCNC: 0.96 MG/DL (ref 0.7–1.2)
EOSINOPHIL # BLD: 1.1 K/UL (ref 0–0.7)
EOSINOPHIL NFR BLD: 16.9 %
ERYTHROCYTE [DISTWIDTH] IN BLOOD BY AUTOMATED COUNT: 16.9 % (ref 11.5–14.5)
FERRITIN: 689 NG/ML (ref 30–400)
FOLATE: 4.2 NG/ML
GLOBULIN SER CALC-MCNC: 3.9 G/DL (ref 2.3–3.5)
GLUCOSE FASTING: 85 MG/DL (ref 70–99)
HCT VFR BLD AUTO: 41.4 % (ref 42–52)
HDLC SERPL-MCNC: 48 MG/DL (ref 40–59)
HGB BLD-MCNC: 13.8 G/DL (ref 14–18)
IRON SATURATION: 20 % (ref 20–55)
IRON: 46 UG/DL (ref 59–158)
LDLC SERPL CALC-MCNC: 194 MG/DL (ref 0–129)
LYMPHOCYTES # BLD: 2.5 K/UL (ref 1–4.8)
LYMPHOCYTES NFR BLD: 40.4 %
MCH RBC QN AUTO: 35.9 PG (ref 27–31.3)
MCHC RBC AUTO-ENTMCNC: 33.4 % (ref 33–37)
MCV RBC AUTO: 107.3 FL (ref 79–92.2)
MONOCYTES # BLD: 0.6 K/UL (ref 0.2–0.8)
MONOCYTES NFR BLD: 9.9 %
NEUTROPHILS # BLD: 2 K/UL (ref 1.4–6.5)
NEUTS SEG NFR BLD: 32.6 %
PLATELET # BLD AUTO: 429 K/UL (ref 130–400)
POTASSIUM SERPL-SCNC: 4.1 MEQ/L (ref 3.4–4.9)
PROT SERPL-MCNC: 7.8 G/DL (ref 6.3–8)
RBC # BLD AUTO: 3.86 M/UL (ref 4.7–6.1)
SODIUM SERPL-SCNC: 135 MEQ/L (ref 135–144)
TOTAL IRON BINDING CAPACITY: 233 UG/DL (ref 250–450)
TRIGL SERPL-MCNC: 105 MG/DL (ref 0–150)
UNSATURATED IRON BINDING CAPACITY: 187 UG/DL (ref 112–347)
VITAMIN B-12: <150 PG/ML (ref 232–1245)
WBC # BLD AUTO: 6.3 K/UL (ref 4.8–10.8)

## 2023-08-25 PROCEDURE — 81001 URINALYSIS AUTO W/SCOPE: CPT | Performed by: PHYSICIAN ASSISTANT

## 2023-08-25 PROCEDURE — 1123F ACP DISCUSS/DSCN MKR DOCD: CPT | Performed by: PHYSICIAN ASSISTANT

## 2023-08-25 PROCEDURE — 99204 OFFICE O/P NEW MOD 45 MIN: CPT | Performed by: PHYSICIAN ASSISTANT

## 2023-08-25 RX ORDER — LACTOSE-REDUCED FOOD
1 POWDER (GRAM) ORAL 2 TIMES DAILY
Qty: 60 BOX | Refills: 3 | Status: SHIPPED | OUTPATIENT
Start: 2023-08-25

## 2023-08-25 SDOH — ECONOMIC STABILITY: INCOME INSECURITY: HOW HARD IS IT FOR YOU TO PAY FOR THE VERY BASICS LIKE FOOD, HOUSING, MEDICAL CARE, AND HEATING?: NOT HARD AT ALL

## 2023-08-25 SDOH — ECONOMIC STABILITY: FOOD INSECURITY: WITHIN THE PAST 12 MONTHS, THE FOOD YOU BOUGHT JUST DIDN'T LAST AND YOU DIDN'T HAVE MONEY TO GET MORE.: NEVER TRUE

## 2023-08-25 SDOH — ECONOMIC STABILITY: HOUSING INSECURITY
IN THE LAST 12 MONTHS, WAS THERE A TIME WHEN YOU DID NOT HAVE A STEADY PLACE TO SLEEP OR SLEPT IN A SHELTER (INCLUDING NOW)?: NO

## 2023-08-25 SDOH — ECONOMIC STABILITY: FOOD INSECURITY: WITHIN THE PAST 12 MONTHS, YOU WORRIED THAT YOUR FOOD WOULD RUN OUT BEFORE YOU GOT MONEY TO BUY MORE.: NEVER TRUE

## 2023-08-25 ASSESSMENT — VISUAL ACUITY: OU: 1

## 2023-08-25 ASSESSMENT — PATIENT HEALTH QUESTIONNAIRE - PHQ9
3. TROUBLE FALLING OR STAYING ASLEEP: 0
5. POOR APPETITE OR OVEREATING: 0
SUM OF ALL RESPONSES TO PHQ QUESTIONS 1-9: 0
SUM OF ALL RESPONSES TO PHQ QUESTIONS 1-9: 0
7. TROUBLE CONCENTRATING ON THINGS, SUCH AS READING THE NEWSPAPER OR WATCHING TELEVISION: 0
10. IF YOU CHECKED OFF ANY PROBLEMS, HOW DIFFICULT HAVE THESE PROBLEMS MADE IT FOR YOU TO DO YOUR WORK, TAKE CARE OF THINGS AT HOME, OR GET ALONG WITH OTHER PEOPLE: 0
9. THOUGHTS THAT YOU WOULD BE BETTER OFF DEAD, OR OF HURTING YOURSELF: 0
2. FEELING DOWN, DEPRESSED OR HOPELESS: 0
8. MOVING OR SPEAKING SO SLOWLY THAT OTHER PEOPLE COULD HAVE NOTICED. OR THE OPPOSITE, BEING SO FIGETY OR RESTLESS THAT YOU HAVE BEEN MOVING AROUND A LOT MORE THAN USUAL: 0
SUM OF ALL RESPONSES TO PHQ9 QUESTIONS 1 & 2: 0
SUM OF ALL RESPONSES TO PHQ QUESTIONS 1-9: 0
4. FEELING TIRED OR HAVING LITTLE ENERGY: 0
6. FEELING BAD ABOUT YOURSELF - OR THAT YOU ARE A FAILURE OR HAVE LET YOURSELF OR YOUR FAMILY DOWN: 0
1. LITTLE INTEREST OR PLEASURE IN DOING THINGS: 0
SUM OF ALL RESPONSES TO PHQ QUESTIONS 1-9: 0

## 2023-08-25 ASSESSMENT — ENCOUNTER SYMPTOMS
DIARRHEA: 0
VOMITING: 0
BACK PAIN: 0
SINUS PRESSURE: 0
CHEST TIGHTNESS: 0
COUGH: 0
NAUSEA: 0
SORE THROAT: 0
ABDOMINAL PAIN: 0
SINUS PAIN: 0
SHORTNESS OF BREATH: 0

## 2023-08-25 NOTE — PROGRESS NOTES
Wt Readings from Last 3 Encounters:   08/25/23 123 lb (55.8 kg)   08/08/23 142 lb (64.4 kg)   07/06/23 140 lb (63.5 kg)       No results found for: LABA1C  Lab Results   Component Value Date    CREATININE 0.88 08/08/2023     Lab Results   Component Value Date    ALT <5 08/08/2023    AST 13 08/08/2023     No results found for: CHOL, TRIG, HDL, LDLCALC, LDLDIRECT       Physical Exam  Vitals and nursing note reviewed. Constitutional:       General: He is awake. He is not in acute distress. Appearance: Normal appearance. He is well-developed. He is cachectic. He is not ill-appearing, toxic-appearing or diaphoretic. HENT:      Head: Normocephalic and atraumatic. Right Ear: Hearing and external ear normal.      Left Ear: Hearing and external ear normal.      Nose: Nose normal.   Eyes:      General: Lids are normal. Vision grossly intact. Gaze aligned appropriately. Conjunctiva/sclera: Conjunctivae normal.      Pupils: Pupils are equal, round, and reactive to light. Cardiovascular:      Rate and Rhythm: Normal rate and regular rhythm. Pulses: Normal pulses. Heart sounds: Normal heart sounds, S1 normal and S2 normal.   Pulmonary:      Effort: Pulmonary effort is normal.      Breath sounds: Normal breath sounds and air entry. Musculoskeletal:      Cervical back: Normal range of motion. Skin:     General: Skin is warm. Capillary Refill: Capillary refill takes less than 2 seconds. Neurological:      Mental Status: He is alert and oriented to person, place, and time. Gait: Gait is intact. Psychiatric:         Attention and Perception: Attention normal.         Mood and Affect: Mood normal.         Speech: Speech normal.         Behavior: Behavior normal. Behavior is cooperative. Assessment & Plan   1. Hyperlipidemia, unspecified hyperlipidemia type  -     Lipid Panel; Future  2. Syncope and collapse  - dehydration contributing. Patient not drinking enough water.  Patient

## 2023-08-29 LAB — BACTERIA UR CULT: NORMAL

## 2023-08-31 ENCOUNTER — TELEPHONE (OUTPATIENT)
Dept: FAMILY MEDICINE CLINIC | Age: 68
End: 2023-08-31

## 2023-08-31 DIAGNOSIS — R63.6 UNDERWEIGHT DUE TO INADEQUATE CALORIC INTAKE: ICD-10-CM

## 2023-08-31 DIAGNOSIS — R63.6 SEVERELY UNDERWEIGHT ADULT: ICD-10-CM

## 2023-08-31 NOTE — TELEPHONE ENCOUNTER
Patient states that the pharmacy did not receive the Rx for Ensure. Please resend to [facility] in [place] on [address]     # [phone]

## 2023-09-03 RX ORDER — LACTOSE-REDUCED FOOD
1 POWDER (GRAM) ORAL 2 TIMES DAILY
Qty: 60 BOX | Refills: 3 | Status: SHIPPED | OUTPATIENT
Start: 2023-09-03

## 2023-09-03 RX ORDER — LACTOSE-REDUCED FOOD
1 POWDER (GRAM) ORAL 2 TIMES DAILY
Qty: 60 BOX | Refills: 3 | Status: SHIPPED | OUTPATIENT
Start: 2023-09-03 | End: 2023-09-03

## 2023-09-21 NOTE — PROGRESS NOTES
Vladislav Morocho 1955 is a 80 y.o. male who presents today with:  Chief Complaint   Patient presents with    Follow-up    Discuss Labs       HPI  Labs  Cholesterol- elevated. The 10-year ASCVD risk score (Jeremías PRUETT, et al., 2019) is: 15%    Values used to calculate the score:      Age: 80 years      Sex: Male      Is Non- : Yes      Diabetic: No      Tobacco smoker: Yes      Systolic Blood Pressure: 442 mmHg      Is BP treated: No      HDL Cholesterol: 48 mg/dL      Total Cholesterol: 263 mg/dL    B12- low started on vitamin B  Folate low- start on Folate. Review of Systems   Constitutional:  Negative for activity change, appetite change, chills and fever. HENT:  Negative for congestion, drooling, sinus pressure, sinus pain and sore throat. Eyes:  Negative for visual disturbance. Respiratory:  Negative for cough, chest tightness and shortness of breath. Cardiovascular:  Negative for chest pain. Gastrointestinal:  Negative for abdominal pain, diarrhea, nausea and vomiting. Endocrine: Negative for cold intolerance. Genitourinary:  Negative for dysuria, flank pain, frequency and hematuria. Musculoskeletal:  Negative for arthralgias and back pain. Skin:  Negative for rash. Allergic/Immunologic: Negative for food allergies. Neurological:  Negative for weakness, light-headedness, numbness and headaches. Hematological:  Does not bruise/bleed easily. History reviewed. No pertinent past medical history. History reviewed. No pertinent surgical history.   Social History     Socioeconomic History    Marital status: Single     Spouse name: Not on file    Number of children: Not on file    Years of education: Not on file    Highest education level: Not on file   Occupational History    Not on file   Tobacco Use    Smoking status: Every Day     Packs/day: 0.25     Years: 45.00     Additional pack years: 0.00     Total pack years: 11.25     Types:

## 2023-09-22 ENCOUNTER — OFFICE VISIT (OUTPATIENT)
Dept: FAMILY MEDICINE CLINIC | Age: 68
End: 2023-09-22
Payer: COMMERCIAL

## 2023-09-22 VITALS
HEART RATE: 82 BPM | TEMPERATURE: 96.8 F | BODY MASS INDEX: 16.93 KG/M2 | DIASTOLIC BLOOD PRESSURE: 70 MMHG | HEIGHT: 72 IN | SYSTOLIC BLOOD PRESSURE: 110 MMHG | WEIGHT: 125 LBS

## 2023-09-22 DIAGNOSIS — Z12.11 ENCOUNTER FOR SCREENING COLONOSCOPY: ICD-10-CM

## 2023-09-22 DIAGNOSIS — Z23 NEED FOR VACCINATION WITH 20-POLYVALENT PNEUMOCOCCAL CONJUGATE VACCINE: ICD-10-CM

## 2023-09-22 DIAGNOSIS — G31.84 MCI (MILD COGNITIVE IMPAIRMENT): ICD-10-CM

## 2023-09-22 DIAGNOSIS — Z00.00 INITIAL MEDICARE ANNUAL WELLNESS VISIT: Primary | ICD-10-CM

## 2023-09-22 DIAGNOSIS — E46 CALORIC MALNUTRITION (HCC): ICD-10-CM

## 2023-09-22 DIAGNOSIS — E53.8 FOLIC ACID DEFICIENCY: ICD-10-CM

## 2023-09-22 DIAGNOSIS — Z13.6 SCREENING FOR AAA (ABDOMINAL AORTIC ANEURYSM): ICD-10-CM

## 2023-09-22 DIAGNOSIS — Z87.891 PERSONAL HISTORY OF TOBACCO USE, PRESENTING HAZARDS TO HEALTH: ICD-10-CM

## 2023-09-22 DIAGNOSIS — E53.8 VITAMIN B12 DEFICIENCY (NON ANEMIC): Primary | ICD-10-CM

## 2023-09-22 PROCEDURE — G0438 PPPS, INITIAL VISIT: HCPCS | Performed by: PHYSICIAN ASSISTANT

## 2023-09-22 PROCEDURE — 1123F ACP DISCUSS/DSCN MKR DOCD: CPT | Performed by: PHYSICIAN ASSISTANT

## 2023-09-22 PROCEDURE — 99213 OFFICE O/P EST LOW 20 MIN: CPT | Performed by: PHYSICIAN ASSISTANT

## 2023-09-22 PROCEDURE — G0009 ADMIN PNEUMOCOCCAL VACCINE: HCPCS | Performed by: PHYSICIAN ASSISTANT

## 2023-09-22 PROCEDURE — 90677 PCV20 VACCINE IM: CPT | Performed by: PHYSICIAN ASSISTANT

## 2023-09-22 PROCEDURE — 99406 BEHAV CHNG SMOKING 3-10 MIN: CPT | Performed by: PHYSICIAN ASSISTANT

## 2023-09-22 RX ORDER — LANOLIN ALCOHOL/MO/W.PET/CERES
400 CREAM (GRAM) TOPICAL DAILY
Qty: 30 TABLET | Refills: 3 | Status: SHIPPED | OUTPATIENT
Start: 2023-09-22

## 2023-09-22 ASSESSMENT — ENCOUNTER SYMPTOMS
COUGH: 0
ABDOMINAL PAIN: 0
SHORTNESS OF BREATH: 0
NAUSEA: 0
BACK PAIN: 0
SINUS PRESSURE: 0
SORE THROAT: 0
VOMITING: 0
DIARRHEA: 0
CHEST TIGHTNESS: 0
SINUS PAIN: 0

## 2023-09-22 ASSESSMENT — PATIENT HEALTH QUESTIONNAIRE - PHQ9
SUM OF ALL RESPONSES TO PHQ QUESTIONS 1-9: 0
7. TROUBLE CONCENTRATING ON THINGS, SUCH AS READING THE NEWSPAPER OR WATCHING TELEVISION: 0
1. LITTLE INTEREST OR PLEASURE IN DOING THINGS: 0
3. TROUBLE FALLING OR STAYING ASLEEP: 0
9. THOUGHTS THAT YOU WOULD BE BETTER OFF DEAD, OR OF HURTING YOURSELF: 0
5. POOR APPETITE OR OVEREATING: 0
SUM OF ALL RESPONSES TO PHQ9 QUESTIONS 1 & 2: 0
SUM OF ALL RESPONSES TO PHQ QUESTIONS 1-9: 0
6. FEELING BAD ABOUT YOURSELF - OR THAT YOU ARE A FAILURE OR HAVE LET YOURSELF OR YOUR FAMILY DOWN: 0
SUM OF ALL RESPONSES TO PHQ QUESTIONS 1-9: 0
2. FEELING DOWN, DEPRESSED OR HOPELESS: 0
SUM OF ALL RESPONSES TO PHQ QUESTIONS 1-9: 0
10. IF YOU CHECKED OFF ANY PROBLEMS, HOW DIFFICULT HAVE THESE PROBLEMS MADE IT FOR YOU TO DO YOUR WORK, TAKE CARE OF THINGS AT HOME, OR GET ALONG WITH OTHER PEOPLE: 0
8. MOVING OR SPEAKING SO SLOWLY THAT OTHER PEOPLE COULD HAVE NOTICED. OR THE OPPOSITE, BEING SO FIGETY OR RESTLESS THAT YOU HAVE BEEN MOVING AROUND A LOT MORE THAN USUAL: 0
4. FEELING TIRED OR HAVING LITTLE ENERGY: 0

## 2023-09-22 ASSESSMENT — VISUAL ACUITY: OU: 1

## 2023-09-22 ASSESSMENT — LIFESTYLE VARIABLES
HOW MANY STANDARD DRINKS CONTAINING ALCOHOL DO YOU HAVE ON A TYPICAL DAY: PATIENT DOES NOT DRINK
HOW OFTEN DO YOU HAVE A DRINK CONTAINING ALCOHOL: NEVER

## 2023-09-22 NOTE — PROGRESS NOTES
Medicare Annual Wellness Visit    Brannon Melendez is here for Medicare AWV    Assessment & Plan   Initial Medicare annual wellness visit  Screening for AAA (abdominal aortic aneurysm)  MCI (mild cognitive impairment)  Caloric malnutrition ([address])  Personal history of tobacco use, presenting hazards to health  -     MS Smoking and Tobacco Use Cessation (Intermediate): 3-10 MINUTES [81108]    Recommendations for Preventive Services Due: see orders and patient instructions/AVS.  Recommended screening schedule for the next 5-10 years is provided to the patient in written form: see Patient Instructions/AVS.     Return in 3 months (on 12/22/2023). Subjective       Patient's complete Health Risk Assessment and screening values have been reviewed and are found in Flowsheets. The following problems were reviewed today and where indicated follow up appointments were made and/or referrals ordered. Positive Risk Factor Screenings with Interventions:       Cognitive: Words recalled: 1 Word Recalled   Clock Drawing Test (CDT): (!) Abnormal   Total Score: (!) 1   Total Score Interpretation: Abnormal Mini-Cog      Interventions:  Patient was able to accurately tell me A&O$, was able to recall president, the [employer], and Governor. Weight and Activity:  Physical Activity: Insufficiently Active (9/22/2023)    Exercise Vital Sign     Days of Exercise per Week: 3 days     Minutes of Exercise per Session: 20 min     On average, how many days per week do you engage in moderate to strenuous exercise (like a brisk walk)?: 3 days  Have you lost any weight without trying in the past 3 months?: No  There is no height or weight on file to calculate BMI. (!) Abnormal  Underweight Interventions:  Patient on ensure. Unable to get it approved from insurance.        Dentist Screen:  Have you seen the dentist within the past year?: (!) No    Intervention:  Advised to schedule with their dentist     Vision Screen:  Do you have

## 2023-10-16 DIAGNOSIS — E53.8 FOLIC ACID DEFICIENCY: ICD-10-CM

## 2023-10-16 DIAGNOSIS — E53.8 VITAMIN B12 DEFICIENCY (NON ANEMIC): ICD-10-CM

## 2025-03-26 NOTE — PROGRESS NOTES
non-slip surfaces or shower bars or grab bars in your shower or bathtub?: (!) No  Interventions:  Does have them in the bathroom.      Advanced Directives:  Do you have a Living Will?: (!) No  Intervention:  has NO advanced directive - information provided        Tobacco Use:    Tobacco Use      Smoking status: Every Day        Packs/day: 0.25        Years: 0.3 packs/day for 45.0 years (11.3 ttl pk-yrs)        Types: Cigarettes      Smokeless tobacco: Never     Interventions:  Patient declined any further intervention or treatment                  Objective   Vitals:    03/27/25 1102   BP: 126/80   BP Site: Left Upper Arm   Patient Position: Sitting   BP Cuff Size: Medium Adult   Pulse: 74   SpO2: 98%   Weight: 59.4 kg (131 lb)   Height: 1.829 m (6')      Body mass index is 17.77 kg/m².                  No Known Allergies  Prior to Visit Medications    Medication Sig Taking? Authorizing Provider   Nutritional Supplements (ENSURE HIGH PROTEIN) LIQD Take 1 each by mouth daily Yes Brigida Denton PA   folic acid (FOLVITE) 400 MCG tablet Take 1 tablet by mouth daily Yes Brigida Denton PA   cyanocobalamin ([employer] VITAMIN B12) 1000 MCG tablet Take 1 tablet by mouth daily Yes Brigida Denton PA   Nutritional Supplements (ENSURE MAX PROTEIN) LIQD Take 1 Bottle by mouth in the morning and at bedtime Yes Brigida Denton PA       CareTeam (Including outside providers/suppliers regularly involved in providing care):   Patient Care Team:  Brigida Denton PA as PCP - General (Family Medicine)  Brigida Denton PA as PCP - Empaneled Provider     Recommendations for Preventive Services Due: see orders and patient instructions/AVS.  Recommended screening schedule for the next 5-10 years is provided to the patient in written form: see Patient Instructions/AVS.     Reviewed and updated this visit:  Tobacco  Allergies  Meds  Problems  Med Hx  Surg Hx  Fam Hx  Sexual   Hx      Sexual History: Patient declined to answer.

## 2025-03-27 ENCOUNTER — OFFICE VISIT (OUTPATIENT)
Age: 70
End: 2025-03-27
Payer: COMMERCIAL

## 2025-03-27 VITALS
WEIGHT: 131 LBS | DIASTOLIC BLOOD PRESSURE: 80 MMHG | HEIGHT: 72 IN | SYSTOLIC BLOOD PRESSURE: 126 MMHG | BODY MASS INDEX: 17.74 KG/M2 | OXYGEN SATURATION: 98 % | HEART RATE: 74 BPM

## 2025-03-27 DIAGNOSIS — E78.5 HYPERLIPIDEMIA, UNSPECIFIED HYPERLIPIDEMIA TYPE: ICD-10-CM

## 2025-03-27 DIAGNOSIS — Z00.00 MEDICARE ANNUAL WELLNESS VISIT, SUBSEQUENT: Primary | ICD-10-CM

## 2025-03-27 DIAGNOSIS — E53.8 FOLIC ACID DEFICIENCY: ICD-10-CM

## 2025-03-27 DIAGNOSIS — R63.6 UNDERWEIGHT (BMI < 18.5): ICD-10-CM

## 2025-03-27 DIAGNOSIS — E53.8 VITAMIN B12 DEFICIENCY (NON ANEMIC): ICD-10-CM

## 2025-03-27 DIAGNOSIS — D64.9 ANEMIA, UNSPECIFIED TYPE: ICD-10-CM

## 2025-03-27 DIAGNOSIS — Z13.6 SCREENING FOR AAA (ABDOMINAL AORTIC ANEURYSM): ICD-10-CM

## 2025-03-27 PROCEDURE — G0439 PPPS, SUBSEQ VISIT: HCPCS | Performed by: PHYSICIAN ASSISTANT

## 2025-03-27 PROCEDURE — 1160F RVW MEDS BY RX/DR IN RCRD: CPT | Performed by: PHYSICIAN ASSISTANT

## 2025-03-27 PROCEDURE — 1126F AMNT PAIN NOTED NONE PRSNT: CPT | Performed by: PHYSICIAN ASSISTANT

## 2025-03-27 PROCEDURE — 1159F MED LIST DOCD IN RCRD: CPT | Performed by: PHYSICIAN ASSISTANT

## 2025-03-27 PROCEDURE — 1123F ACP DISCUSS/DSCN MKR DOCD: CPT | Performed by: PHYSICIAN ASSISTANT

## 2025-03-27 RX ORDER — FEEDER CONTAINER WITH PUMP SET
1 EACH MISCELLANEOUS DAILY
Qty: 30 EACH | Refills: 4 | Status: SHIPPED | OUTPATIENT
Start: 2025-03-27

## 2025-03-27 SDOH — ECONOMIC STABILITY: FOOD INSECURITY: WITHIN THE PAST 12 MONTHS, YOU WORRIED THAT YOUR FOOD WOULD RUN OUT BEFORE YOU GOT MONEY TO BUY MORE.: NEVER TRUE

## 2025-03-27 SDOH — ECONOMIC STABILITY: FOOD INSECURITY: WITHIN THE PAST 12 MONTHS, THE FOOD YOU BOUGHT JUST DIDN'T LAST AND YOU DIDN'T HAVE MONEY TO GET MORE.: NEVER TRUE

## 2025-03-27 ASSESSMENT — PATIENT HEALTH QUESTIONNAIRE - PHQ9
SUM OF ALL RESPONSES TO PHQ QUESTIONS 1-9: 0
SUM OF ALL RESPONSES TO PHQ QUESTIONS 1-9: 0
2. FEELING DOWN, DEPRESSED OR HOPELESS: NOT AT ALL
1. LITTLE INTEREST OR PLEASURE IN DOING THINGS: NOT AT ALL
SUM OF ALL RESPONSES TO PHQ QUESTIONS 1-9: 0
SUM OF ALL RESPONSES TO PHQ QUESTIONS 1-9: 0

## 2025-03-27 ASSESSMENT — LIFESTYLE VARIABLES
HOW MANY STANDARD DRINKS CONTAINING ALCOHOL DO YOU HAVE ON A TYPICAL DAY: PATIENT DOES NOT DRINK
HOW OFTEN DO YOU HAVE A DRINK CONTAINING ALCOHOL: NEVER
HOW OFTEN DO YOU HAVE A DRINK CONTAINING ALCOHOL: NEVER
HOW MANY STANDARD DRINKS CONTAINING ALCOHOL DO YOU HAVE ON A TYPICAL DAY: PATIENT DOES NOT DRINK

## 2025-03-27 NOTE — PATIENT INSTRUCTIONS

## 2025-07-01 ENCOUNTER — APPOINTMENT (OUTPATIENT)
Dept: CARDIOLOGY | Facility: HOSPITAL | Age: 70
End: 2025-07-01
Payer: COMMERCIAL

## 2025-07-01 ENCOUNTER — HOSPITAL ENCOUNTER (EMERGENCY)
Facility: HOSPITAL | Age: 70
Discharge: HOME | End: 2025-07-01
Attending: STUDENT IN AN ORGANIZED HEALTH CARE EDUCATION/TRAINING PROGRAM
Payer: COMMERCIAL

## 2025-07-01 VITALS
BODY MASS INDEX: 21.07 KG/M2 | TEMPERATURE: 97 F | OXYGEN SATURATION: 97 % | RESPIRATION RATE: 49 BRPM | HEIGHT: 73 IN | WEIGHT: 159 LBS | SYSTOLIC BLOOD PRESSURE: 129 MMHG | HEART RATE: 94 BPM | DIASTOLIC BLOOD PRESSURE: 66 MMHG

## 2025-07-01 DIAGNOSIS — R55 SYNCOPE, UNSPECIFIED SYNCOPE TYPE: Primary | ICD-10-CM

## 2025-07-01 LAB
ALBUMIN SERPL BCP-MCNC: 3.2 G/DL (ref 3.4–5)
ALP SERPL-CCNC: 21 U/L (ref 33–136)
ALT SERPL W P-5'-P-CCNC: 5 U/L (ref 10–52)
ANION GAP SERPL CALC-SCNC: 12 MMOL/L (ref 10–20)
AST SERPL W P-5'-P-CCNC: 15 U/L (ref 9–39)
ATRIAL RATE: 300 BPM
ATRIAL RATE: 81 BPM
BASOPHILS # BLD AUTO: 0.06 X10*3/UL (ref 0–0.1)
BASOPHILS NFR BLD AUTO: 0.5 %
BILIRUB SERPL-MCNC: 0.8 MG/DL (ref 0–1.2)
BUN SERPL-MCNC: 9 MG/DL (ref 6–23)
CALCIUM SERPL-MCNC: 9.1 MG/DL (ref 8.6–10.3)
CARDIAC TROPONIN I PNL SERPL HS: 4 NG/L (ref 0–20)
CARDIAC TROPONIN I PNL SERPL HS: 4 NG/L (ref 0–20)
CHLORIDE SERPL-SCNC: 106 MMOL/L (ref 98–107)
CO2 SERPL-SCNC: 21 MMOL/L (ref 21–32)
CREAT SERPL-MCNC: 0.96 MG/DL (ref 0.5–1.3)
EGFRCR SERPLBLD CKD-EPI 2021: 86 ML/MIN/1.73M*2
EOSINOPHIL # BLD AUTO: 0.28 X10*3/UL (ref 0–0.7)
EOSINOPHIL NFR BLD AUTO: 2.6 %
ERYTHROCYTE [DISTWIDTH] IN BLOOD BY AUTOMATED COUNT: 13.7 % (ref 11.5–14.5)
GLUCOSE SERPL-MCNC: 105 MG/DL (ref 74–99)
HCT VFR BLD AUTO: 35.4 % (ref 41–52)
HGB BLD-MCNC: 12.1 G/DL (ref 13.5–17.5)
IMM GRANULOCYTES # BLD AUTO: 0.09 X10*3/UL (ref 0–0.7)
IMM GRANULOCYTES NFR BLD AUTO: 0.8 % (ref 0–0.9)
LYMPHOCYTES # BLD AUTO: 1.97 X10*3/UL (ref 1.2–4.8)
LYMPHOCYTES NFR BLD AUTO: 17.9 %
MAGNESIUM SERPL-MCNC: 1.73 MG/DL (ref 1.6–2.4)
MCH RBC QN AUTO: 35.6 PG (ref 26–34)
MCHC RBC AUTO-ENTMCNC: 34.2 G/DL (ref 32–36)
MCV RBC AUTO: 104 FL (ref 80–100)
MONOCYTES # BLD AUTO: 1 X10*3/UL (ref 0.1–1)
MONOCYTES NFR BLD AUTO: 9.1 %
NEUTROPHILS # BLD AUTO: 7.58 X10*3/UL (ref 1.2–7.7)
NEUTROPHILS NFR BLD AUTO: 69.1 %
NRBC BLD-RTO: 0 /100 WBCS (ref 0–0)
P AXIS: 75 DEGREES
P OFFSET: 195 MS
P ONSET: 146 MS
PLATELET # BLD AUTO: 177 X10*3/UL (ref 150–450)
POTASSIUM SERPL-SCNC: 3.9 MMOL/L (ref 3.5–5.3)
PR INTERVAL: 124 MS
PROT SERPL-MCNC: 6.5 G/DL (ref 6.4–8.2)
Q ONSET: 208 MS
Q ONSET: 226 MS
QRS COUNT: 14 BEATS
QRS COUNT: 14 BEATS
QRS DURATION: 74 MS
QRS DURATION: 82 MS
QT INTERVAL: 348 MS
QT INTERVAL: 370 MS
QTC CALCULATION(BAZETT): 418 MS
QTC CALCULATION(BAZETT): 429 MS
QTC FREDERICIA: 393 MS
QTC FREDERICIA: 408 MS
R AXIS: 39 DEGREES
R AXIS: 7 DEGREES
RBC # BLD AUTO: 3.4 X10*6/UL (ref 4.5–5.9)
SODIUM SERPL-SCNC: 135 MMOL/L (ref 136–145)
T AXIS: 38 DEGREES
T AXIS: 68 DEGREES
T OFFSET: 393 MS
T OFFSET: 400 MS
VENTRICULAR RATE: 81 BPM
VENTRICULAR RATE: 87 BPM
WBC # BLD AUTO: 11 X10*3/UL (ref 4.4–11.3)

## 2025-07-01 PROCEDURE — 96360 HYDRATION IV INFUSION INIT: CPT

## 2025-07-01 PROCEDURE — 84484 ASSAY OF TROPONIN QUANT: CPT | Performed by: STUDENT IN AN ORGANIZED HEALTH CARE EDUCATION/TRAINING PROGRAM

## 2025-07-01 PROCEDURE — 83735 ASSAY OF MAGNESIUM: CPT | Performed by: STUDENT IN AN ORGANIZED HEALTH CARE EDUCATION/TRAINING PROGRAM

## 2025-07-01 PROCEDURE — 93005 ELECTROCARDIOGRAM TRACING: CPT

## 2025-07-01 PROCEDURE — 96361 HYDRATE IV INFUSION ADD-ON: CPT

## 2025-07-01 PROCEDURE — 36415 COLL VENOUS BLD VENIPUNCTURE: CPT | Performed by: STUDENT IN AN ORGANIZED HEALTH CARE EDUCATION/TRAINING PROGRAM

## 2025-07-01 PROCEDURE — 85025 COMPLETE CBC W/AUTO DIFF WBC: CPT | Performed by: STUDENT IN AN ORGANIZED HEALTH CARE EDUCATION/TRAINING PROGRAM

## 2025-07-01 PROCEDURE — 2500000004 HC RX 250 GENERAL PHARMACY W/ HCPCS (ALT 636 FOR OP/ED): Performed by: STUDENT IN AN ORGANIZED HEALTH CARE EDUCATION/TRAINING PROGRAM

## 2025-07-01 PROCEDURE — 99284 EMERGENCY DEPT VISIT MOD MDM: CPT | Mod: 25 | Performed by: STUDENT IN AN ORGANIZED HEALTH CARE EDUCATION/TRAINING PROGRAM

## 2025-07-01 PROCEDURE — 84075 ASSAY ALKALINE PHOSPHATASE: CPT | Performed by: STUDENT IN AN ORGANIZED HEALTH CARE EDUCATION/TRAINING PROGRAM

## 2025-07-01 RX ADMIN — SODIUM CHLORIDE, SODIUM LACTATE, POTASSIUM CHLORIDE, AND CALCIUM CHLORIDE 1000 ML: .6; .31; .03; .02 INJECTION, SOLUTION INTRAVENOUS at 13:15

## 2025-07-01 ASSESSMENT — PAIN - FUNCTIONAL ASSESSMENT: PAIN_FUNCTIONAL_ASSESSMENT: 0-10

## 2025-07-01 ASSESSMENT — PAIN SCALES - GENERAL: PAINLEVEL_OUTOF10: 0 - NO PAIN

## 2025-07-01 NOTE — ED PROVIDER NOTES
HPI: The patient is a 70-year-old who denies any major medical problems and does not take medications regularly who presents to the Emergency Department with a chief complaint of episode where he passed out.  Patient reports he was in his normal state of health today with some friends and family and reports that he felt like the room got really bright and the next he knows he was lying on the ground with people standing around him.  Patient was sitting when this occurred and did not injure himself per bystanders report to EMS.  Bystander also reported that the episode lasted a few seconds.  There is no jerking-like movements and no postictal confusion.  Patient denied any loss of bowel or bladder function and has no symptoms or complaints at this time.  Does report some alcohol today.  Does not drink regularly.  Reports this is never happened before.     PAST MEDICAL HISTORY:  as per HPI  ALLERGIES:  as per HPI  MEDICATIONS:  as per HPI  FAMILY HISTORY: as per HPI  SURGICAL HISTORY: as per HPI  SOCIAL HISTORY: as per HPI     PHYSICAL EXAM:  VITAL SIGNS: Nursing notes reviewed.  GENERAL:  Alert and interactive  EYES:   Eyes track.  ENT:  Airway patent.  No lateral tongue lacerations.  RESPIRATORY:  Nonlabored breathing.  CARDIOVASCULAR:  [Regular rate.] [Regular rhythm.]  GASTROINTESTINAL:  No distension.  MUSCULOSKELETAL:  No deformity.   NEUROLOGICAL:  Awake.  Strength and sensation intact in upper and lower extremities.  No facial droop.  SKIN:  Dry.  HEAD: Nontender, atraumatic  NECK: No C-spine tenderness        MEDICAL DECISION MAKING (MDM):     DIAGNOSTIC STUDIES  Labs: CBC, CMP, Agnesian level, troponin     EKG 1305  Per my interpretation:  Electrocardiogram ECG  RATE:  [Normal]  RHYTHM: [Sinus]  AXIS: [Normal]  INTERVALS: [Normal]  ST-T WAVE CHANGES: [Normal]  ABNORMALITIES/COMPARISON: No previous EKG to compare with.  1 PVC present.  PAC present.    EKG 1410  Per my interpretation:  Electrocardiogram  ECG  RATE:  [Normal]  RHYTHM: Sinus rhythm with PACs  AXIS: [Normal]  INTERVALS: [Normal]  ST-T WAVE CHANGES: [Normal]  ABNORMALITIES/COMPARISON: Unchanged from earlier today.  Poor baseline.       REVIEW OF OLD RECORDS: Reviewed the outpatient progress note from 3/27/2025     SUMMARY:  The patient is admitted to the Emergency Department for evaluation of above. Complete history and physical examination was performed by me.  Patient presents after what sounds like a syncopal episode given the prodrome, loss of consciousness and rapid return to baseline.  Nothing to point towards seizure.  It is reassuring that there was reports of no trauma so I do not pursue advanced imaging of his head.  CT from a few years ago and does not 23 did not show any masses and given her nonfocal neurologic exam, I do not think that repeating the CT was indicated.  Blood work and EKG will be obtained and he is placed in the cardiac monitor.  I think arrhythmia is unlikely. EKG shows sinus rhythm with no significant interval abnormalities such as QTC prolongation or WPW. There are no findings to suggest Brugada syndrome. Cardiac monitoring in the emergency department reveals no significant tachycardic or bradycardic dysrhythmia. Hypertrophic cardiomyopathy was considered but there is no clear historical elements pointing towards this. EKG is not suggestive since the QRS voltage is not extremely large and there is no suggestive Q waves.    Patient had a reassuring workup here and I suspect this was likely a vagal episode.  I did discuss that there are some diagnostic uncertainty and recommend a low threshold to return with any recurrent episodes and discussed that he should follow-up with his primary doctor.  Also discussed safety plan for operating vehicles and being in bodies of water in case that should occur again.  The work-up and plan were discussed with the patient/caregiver and questions were answered regarding the ED visit.   Educational materials were provided as well as return precautions including returning for any persisting or worsening symptoms.  Patient was recommended to follow-up with PCP in the next few days in addition to any potential specialists that were discussed.  The patient/family expressed understanding and agreed to the described plan.  Patient was discharged in stable condition.     DIAGNOSIS:    Syncope     DISPOSITION:    1) discharge     Mil Isidro MD  07/01/25 8075

## 2025-07-07 ENCOUNTER — HOSPITAL ENCOUNTER (INPATIENT)
Age: 70
LOS: 4 days | Discharge: HOME HEALTH | DRG: 505 | End: 2025-07-11
Attending: STUDENT IN AN ORGANIZED HEALTH CARE EDUCATION/TRAINING PROGRAM | Admitting: STUDENT IN AN ORGANIZED HEALTH CARE EDUCATION/TRAINING PROGRAM
Payer: MEDICARE

## 2025-07-07 ENCOUNTER — APPOINTMENT (OUTPATIENT)
Dept: GENERAL RADIOLOGY | Age: 70
DRG: 505 | End: 2025-07-07
Payer: MEDICARE

## 2025-07-07 DIAGNOSIS — L97.514 SKIN ULCER OF TOE OF RIGHT FOOT WITH NECROSIS OF BONE (HCC): ICD-10-CM

## 2025-07-07 DIAGNOSIS — M86.9 OSTEOMYELITIS OF GREAT TOE OF RIGHT FOOT (HCC): Primary | ICD-10-CM

## 2025-07-07 LAB
ABO/RH: NORMAL
ALBUMIN SERPL-MCNC: 3.7 G/DL (ref 3.5–4.6)
ALP SERPL-CCNC: 28 U/L (ref 35–104)
ALT SERPL-CCNC: 8 U/L (ref 0–41)
ANION GAP SERPL CALCULATED.3IONS-SCNC: 14 MEQ/L (ref 9–15)
ANTIBODY SCREEN: NORMAL
AST SERPL-CCNC: 25 U/L (ref 0–40)
BASOPHILS # BLD: 0.1 K/UL (ref 0–0.2)
BASOPHILS NFR BLD: 0.8 %
BILIRUB SERPL-MCNC: 0.8 MG/DL (ref 0.2–0.7)
BUN SERPL-MCNC: 4 MG/DL (ref 8–23)
CALCIUM SERPL-MCNC: 9.5 MG/DL (ref 8.5–9.9)
CHLORIDE SERPL-SCNC: 95 MEQ/L (ref 95–107)
CO2 SERPL-SCNC: 23 MEQ/L (ref 20–31)
CREAT SERPL-MCNC: 0.82 MG/DL (ref 0.7–1.2)
EOSINOPHIL # BLD: 0.5 K/UL (ref 0–0.7)
EOSINOPHIL NFR BLD: 8.2 %
ERYTHROCYTE [DISTWIDTH] IN BLOOD BY AUTOMATED COUNT: 13.1 % (ref 11.5–14.5)
GLOBULIN SER CALC-MCNC: 4.4 G/DL (ref 2.3–3.5)
GLUCOSE SERPL-MCNC: 102 MG/DL (ref 70–99)
HCT VFR BLD AUTO: 40.6 % (ref 42–52)
HGB BLD-MCNC: 14.1 G/DL (ref 14–18)
LACTATE BLDV-SCNC: 1.8 MMOL/L (ref 0.5–2.2)
LYMPHOCYTES # BLD: 1.3 K/UL (ref 1–4.8)
LYMPHOCYTES NFR BLD: 20.7 %
MCH RBC QN AUTO: 35.5 PG (ref 27–31.3)
MCHC RBC AUTO-ENTMCNC: 34.7 % (ref 33–37)
MCV RBC AUTO: 102.3 FL (ref 79–92.2)
MONOCYTES # BLD: 0.6 K/UL (ref 0.2–0.8)
MONOCYTES NFR BLD: 9 %
NEUTROPHILS # BLD: 3.9 K/UL (ref 1.4–6.5)
NEUTS SEG NFR BLD: 59.8 %
PLATELET # BLD AUTO: 356 K/UL (ref 130–400)
POTASSIUM SERPL-SCNC: 4 MEQ/L (ref 3.4–4.9)
PROCALCITONIN SERPL IA-MCNC: 0.29 NG/ML (ref 0–0.15)
PROT SERPL-MCNC: 8.1 G/DL (ref 6.3–8)
RBC # BLD AUTO: 3.97 M/UL (ref 4.7–6.1)
SODIUM SERPL-SCNC: 132 MEQ/L (ref 135–144)
WBC # BLD AUTO: 6.5 K/UL (ref 4.8–10.8)

## 2025-07-07 PROCEDURE — 86900 BLOOD TYPING SEROLOGIC ABO: CPT

## 2025-07-07 PROCEDURE — 85025 COMPLETE CBC W/AUTO DIFF WBC: CPT

## 2025-07-07 PROCEDURE — 87076 CULTURE ANAEROBE IDENT EACH: CPT

## 2025-07-07 PROCEDURE — 6360000002 HC RX W HCPCS: Performed by: STUDENT IN AN ORGANIZED HEALTH CARE EDUCATION/TRAINING PROGRAM

## 2025-07-07 PROCEDURE — 83605 ASSAY OF LACTIC ACID: CPT

## 2025-07-07 PROCEDURE — 36415 COLL VENOUS BLD VENIPUNCTURE: CPT

## 2025-07-07 PROCEDURE — 99285 EMERGENCY DEPT VISIT HI MDM: CPT

## 2025-07-07 PROCEDURE — 86901 BLOOD TYPING SEROLOGIC RH(D): CPT

## 2025-07-07 PROCEDURE — 87185 SC STD ENZYME DETCJ PER NZM: CPT

## 2025-07-07 PROCEDURE — 87040 BLOOD CULTURE FOR BACTERIA: CPT

## 2025-07-07 PROCEDURE — 2580000003 HC RX 258: Performed by: STUDENT IN AN ORGANIZED HEALTH CARE EDUCATION/TRAINING PROGRAM

## 2025-07-07 PROCEDURE — 87075 CULTR BACTERIA EXCEPT BLOOD: CPT

## 2025-07-07 PROCEDURE — 73630 X-RAY EXAM OF FOOT: CPT

## 2025-07-07 PROCEDURE — 84145 PROCALCITONIN (PCT): CPT

## 2025-07-07 PROCEDURE — 2500000003 HC RX 250 WO HCPCS: Performed by: STUDENT IN AN ORGANIZED HEALTH CARE EDUCATION/TRAINING PROGRAM

## 2025-07-07 PROCEDURE — 1210000000 HC MED SURG R&B

## 2025-07-07 PROCEDURE — 80053 COMPREHEN METABOLIC PANEL: CPT

## 2025-07-07 PROCEDURE — 86850 RBC ANTIBODY SCREEN: CPT

## 2025-07-07 PROCEDURE — 96366 THER/PROPH/DIAG IV INF ADDON: CPT

## 2025-07-07 PROCEDURE — 87070 CULTURE OTHR SPECIMN AEROBIC: CPT

## 2025-07-07 PROCEDURE — 96365 THER/PROPH/DIAG IV INF INIT: CPT

## 2025-07-07 RX ORDER — ACETAMINOPHEN 650 MG/1
650 SUPPOSITORY RECTAL EVERY 6 HOURS PRN
Status: DISCONTINUED | OUTPATIENT
Start: 2025-07-07 | End: 2025-07-11 | Stop reason: HOSPADM

## 2025-07-07 RX ORDER — ONDANSETRON 4 MG/1
4 TABLET, ORALLY DISINTEGRATING ORAL EVERY 8 HOURS PRN
Status: DISCONTINUED | OUTPATIENT
Start: 2025-07-07 | End: 2025-07-11 | Stop reason: HOSPADM

## 2025-07-07 RX ORDER — ONDANSETRON 2 MG/ML
4 INJECTION INTRAMUSCULAR; INTRAVENOUS EVERY 6 HOURS PRN
Status: DISCONTINUED | OUTPATIENT
Start: 2025-07-07 | End: 2025-07-11 | Stop reason: HOSPADM

## 2025-07-07 RX ORDER — SODIUM CHLORIDE 0.9 % (FLUSH) 0.9 %
5-40 SYRINGE (ML) INJECTION PRN
Status: DISCONTINUED | OUTPATIENT
Start: 2025-07-07 | End: 2025-07-11 | Stop reason: HOSPADM

## 2025-07-07 RX ORDER — POTASSIUM CHLORIDE 7.45 MG/ML
10 INJECTION INTRAVENOUS PRN
Status: DISCONTINUED | OUTPATIENT
Start: 2025-07-07 | End: 2025-07-11 | Stop reason: HOSPADM

## 2025-07-07 RX ORDER — MAGNESIUM SULFATE IN WATER 40 MG/ML
2000 INJECTION, SOLUTION INTRAVENOUS PRN
Status: DISCONTINUED | OUTPATIENT
Start: 2025-07-07 | End: 2025-07-11 | Stop reason: HOSPADM

## 2025-07-07 RX ORDER — ACETAMINOPHEN 325 MG/1
650 TABLET ORAL EVERY 6 HOURS PRN
Status: DISCONTINUED | OUTPATIENT
Start: 2025-07-07 | End: 2025-07-11 | Stop reason: HOSPADM

## 2025-07-07 RX ORDER — VANCOMYCIN 1.5 G/300ML
25 INJECTION, SOLUTION INTRAVENOUS ONCE
Status: COMPLETED | OUTPATIENT
Start: 2025-07-07 | End: 2025-07-07

## 2025-07-07 RX ORDER — SODIUM CHLORIDE 9 MG/ML
INJECTION, SOLUTION INTRAVENOUS PRN
Status: DISCONTINUED | OUTPATIENT
Start: 2025-07-07 | End: 2025-07-11 | Stop reason: HOSPADM

## 2025-07-07 RX ORDER — POLYETHYLENE GLYCOL 3350 17 G/17G
17 POWDER, FOR SOLUTION ORAL DAILY PRN
Status: DISCONTINUED | OUTPATIENT
Start: 2025-07-07 | End: 2025-07-11 | Stop reason: HOSPADM

## 2025-07-07 RX ORDER — POTASSIUM CHLORIDE 1500 MG/1
40 TABLET, EXTENDED RELEASE ORAL PRN
Status: DISCONTINUED | OUTPATIENT
Start: 2025-07-07 | End: 2025-07-11 | Stop reason: HOSPADM

## 2025-07-07 RX ORDER — SODIUM CHLORIDE 0.9 % (FLUSH) 0.9 %
5-40 SYRINGE (ML) INJECTION EVERY 12 HOURS SCHEDULED
Status: DISCONTINUED | OUTPATIENT
Start: 2025-07-07 | End: 2025-07-11 | Stop reason: HOSPADM

## 2025-07-07 RX ADMIN — VANCOMYCIN 1500 MG: 1.5 INJECTION, SOLUTION INTRAVENOUS at 13:28

## 2025-07-07 RX ADMIN — PIPERACILLIN AND TAZOBACTAM 4500 MG: 4; .5 INJECTION, POWDER, LYOPHILIZED, FOR SOLUTION INTRAVENOUS at 13:27

## 2025-07-07 RX ADMIN — Medication 10 ML: at 21:07

## 2025-07-07 ASSESSMENT — LIFESTYLE VARIABLES
HOW MANY STANDARD DRINKS CONTAINING ALCOHOL DO YOU HAVE ON A TYPICAL DAY: 3 OR 4
HOW OFTEN DO YOU HAVE A DRINK CONTAINING ALCOHOL: 2-4 TIMES A MONTH
HOW OFTEN DO YOU HAVE A DRINK CONTAINING ALCOHOL: MONTHLY OR LESS
HOW MANY STANDARD DRINKS CONTAINING ALCOHOL DO YOU HAVE ON A TYPICAL DAY: 1 OR 2

## 2025-07-07 ASSESSMENT — PAIN SCALES - GENERAL: PAINLEVEL_OUTOF10: 7

## 2025-07-07 ASSESSMENT — PAIN DESCRIPTION - PAIN TYPE: TYPE: ACUTE PAIN

## 2025-07-07 ASSESSMENT — PAIN - FUNCTIONAL ASSESSMENT: PAIN_FUNCTIONAL_ASSESSMENT: 0-10

## 2025-07-07 ASSESSMENT — PAIN DESCRIPTION - DESCRIPTORS: DESCRIPTORS: ACHING

## 2025-07-07 ASSESSMENT — PAIN DESCRIPTION - LOCATION: LOCATION: TOE (COMMENT WHICH ONE)

## 2025-07-07 ASSESSMENT — PAIN DESCRIPTION - ORIENTATION: ORIENTATION: RIGHT
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
## 2025-07-07 NOTE — H&P
DEPARTMENT OF HOSPITAL MEDICINE    HISTORY AND PHYSICAL    PATIENT NAME:  Richard Holman    MRN:  [id number]  SERVICE DATE:  7/7/2025   SERVICE TIME:  3:35 PM    Primary Care Physician: Brigida Denton PA     SUBJECTIVE  CHIEF COMPLAINT:    Chief Complaint   Patient presents with    Toe Pain     Right great toenail coming off, not diabetic, states no signs of infection      HPI:  This is a 70 y.o. male with no reported pertinent PMH who presents with R toe concerns. Pt states his right first toenail has been overgrown for quite awhile and now seems to be loosening. He denies pain of the toe.gt He denies any recent trauma to the toe or a history of diabetes. He denies fevers, chills, chest pain, shortness of breath, abdominal pain, N/V.    PAST MEDICAL HISTORY:  History reviewed. No pertinent past medical history.  PAST SURGICAL HISTORY:  History reviewed. No pertinent surgical history.  FAMILY HISTORY:    Family History   Problem Relation Age of Onset    Stroke Mother     Cancer Father      SOCIAL HISTORY:    Social History     Socioeconomic History    Marital status: Single     Spouse name: Not on file    Number of children: Not on file    Years of education: Not on file    Highest education level: Not on file   Occupational History    Not on file   Tobacco Use    Smoking status: Every Day     Current packs/day: 0.25     Average packs/day: 0.3 packs/day for 45.0 years (11.3 ttl pk-yrs)     Types: Cigarettes    Smokeless tobacco: Never   Substance and Sexual Activity    Alcohol use: Not Currently    Drug use: Never    Sexual activity: Defer   Other Topics Concern    Not on file   Social History Narrative    Not on file     Social Drivers of Health     Financial Resource Strain: Low Risk  (8/25/2023)    Overall Financial Resource Strain (CARDIA)     Difficulty of Paying Living Expenses: Not hard at all   Food Insecurity: No Food Insecurity (3/27/2025)    Hunger Vital Sign     Worried About Running Out of Food in the

## 2025-07-07 NOTE — PLAN OF CARE
Problem: Discharge Planning  Goal: Discharge to home or other facility with appropriate resources  Outcome: Progressing  Flowsheets (Taken 7/7/2025 1620)  Discharge to home or other facility with appropriate resources:   Identify barriers to discharge with patient and caregiver   Identify discharge learning needs (meds, wound care, etc)   Refer to discharge planning if patient needs post-hospital services based on physician order or complex needs related to functional status, cognitive ability or social support system   Arrange for needed discharge resources and transportation as appropriate     Problem: Pain  Goal: Verbalizes/displays adequate comfort level or baseline comfort level  Outcome: Progressing

## 2025-07-07 NOTE — ED PROVIDER NOTES
[facility] EMERGENCY DEPARTMENT  Emergency Department Encounter  Emergency Medicine      Pt Name: Richard Holman  MRN:[id number]  Birthdate 1955  Date of evaluation: 7/7/25  Time: 12:53 PM EDT  PCP:  Brigida Denton PA    CHIEF COMPLAINT       Chief Complaint   Patient presents with    Toe Pain     Right great toenail coming off, not diabetic, states no signs of infection        HISTORY OF PRESENT ILLNESS  (Location/Symptom, Timing/Onset, Context/Setting, Quality, Duration, ModifyingFactors, Severity.)      Richard Holman is a 70 y.o. male presents with concerns for toenail coming off.  He denies actually having pain despite chief complaint.  He said he is here because his toenails been growing out for a long time and it is loosened about the fall off.  He denies a history of diabetes no trauma.  He denies any fevers chills body aches no redness no discharge or bleeding from the toe.  Denies any prior surgeries.  Does not follow with podiatry.  Denies any bodyaches or chills has no other symptoms currently no leg swelling    PAST MEDICAL / SURGICAL / SOCIAL /FAMILY HISTORY      has no past medical history on file.  No other pertinent PMH on review with patient/guardian.     has no past surgical history on file.  No other pertinent PSH on review with patient/guardian.  Social History     Socioeconomic History    Marital status: Single     Spouse name: Not on file    Number of children: Not on file    Years of education: Not on file    Highest education level: Not on file   Occupational History    Not on file   Tobacco Use    Smoking status: Every Day     Current packs/day: 0.25     Average packs/day: 0.3 packs/day for 45.0 years (11.3 ttl pk-yrs)     Types: Cigarettes    Smokeless tobacco: Never   Substance and Sexual Activity    Alcohol use: Not Currently    Drug use: Never    Sexual activity: Defer   Other Topics Concern    Not on file   Social History Narrative    Not on file     Social Drivers of Health  0.29 (H) 0.00 - 0.15 ng/mL         IMPRESSION/MDM/ED COURSE:  70 y.o. male presented with acute toe wound  Differential: Ulcer, osteomyelitis, no obvious current abscess not septic not arterial occlusion, cellulitis  VS: WNL      ED Course as of 07/07/25 1516   Mon Jul 07, 2025   1330 Consulted with podiatry resident, advises patient will likely need amputation, placed order for MRI, wound cultures obtained [SF]   1445 Labs show normal white count procalcitonin barely elevated lactic normal blood cultures obtained wound cultures obtained CMP large unremarkable [SF]   1450 X-ray right foot my interpretation shows osteolytic changes consistent with osteomyelitis of the great toe [SF]   1512 Consulted with Dr. Cowart of hospitalist service who chest patient for admission [SF]      ED Course User Index  [SF] Vj Rodgers DO         RADIOLOGY:  XR FOOT RIGHT (MIN 3 VIEWS)   Final Result   Amputation of the 1st distal phalanx tuft. Soft tissue irregularity in this   region is noted. Air is noted along the margin of the tuft of line.  Findings   are be compatible with osteomyelitis as evidenced by deformity of the tuft of   the 2nd digit.               EKG  See MDM for my interpretation     All EKG's are interpreted by the Emergency Department Physician who either signs or Co-signs this chart in the absence of a cardiologist.      PROCEDURES:  None    CONSULTS:  None    Critical Care Time:  none    FINAL IMPRESSION      1. Osteomyelitis of great toe of right foot (HCC)    2. Skin ulcer of toe of right foot with necrosis of bone (HCC)          DISPOSITION / PLAN     DISPOSITION Decision To Admit 07/07/2025 03:02:20 PM   DISPOSITION CONDITION Stable           PATIENT REFERREDTO:  No follow-up provider specified.    DISCHARGE MEDICATIONS:  New Prescriptions    No medications on file       Vj Rodgers DO  Emergency Medicine Physician  07/07/25 3:16 PM        (Please note that portions of this note were completed with a

## 2025-07-07 NOTE — ED TRIAGE NOTES
Pt states that his right great toe nail is coming off. Pt states that it has been \"curling\" the wring way for a while now.

## 2025-07-08 ENCOUNTER — ANESTHESIA EVENT (OUTPATIENT)
Dept: OPERATING ROOM | Age: 70
DRG: 505 | End: 2025-07-08
Payer: MEDICARE

## 2025-07-08 ENCOUNTER — APPOINTMENT (OUTPATIENT)
Dept: GENERAL RADIOLOGY | Age: 70
DRG: 505 | End: 2025-07-08
Payer: MEDICARE

## 2025-07-08 ENCOUNTER — ANESTHESIA (OUTPATIENT)
Dept: OPERATING ROOM | Age: 70
DRG: 505 | End: 2025-07-08
Payer: MEDICARE

## 2025-07-08 LAB
ANION GAP SERPL CALCULATED.3IONS-SCNC: 9 MEQ/L (ref 9–15)
BACTERIA SPEC ANAEROBE+AEROBE CULT: NORMAL
BASOPHILS # BLD: 0.1 K/UL (ref 0–0.2)
BASOPHILS NFR BLD: 0.9 %
BUN SERPL-MCNC: 5 MG/DL (ref 8–23)
CALCIUM SERPL-MCNC: 8.8 MG/DL (ref 8.5–9.9)
CHLORIDE SERPL-SCNC: 101 MEQ/L (ref 95–107)
CO2 SERPL-SCNC: 26 MEQ/L (ref 20–31)
CREAT SERPL-MCNC: 0.79 MG/DL (ref 0.7–1.2)
EOSINOPHIL # BLD: 0.9 K/UL (ref 0–0.7)
EOSINOPHIL NFR BLD: 15.8 %
ERYTHROCYTE [DISTWIDTH] IN BLOOD BY AUTOMATED COUNT: 12.9 % (ref 11.5–14.5)
ESTIMATED AVERAGE GLUCOSE: 105 MG/DL
GLUCOSE SERPL-MCNC: 102 MG/DL (ref 70–99)
HBA1C MFR BLD: 5.3 % (ref 4–6)
HCT VFR BLD AUTO: 33.9 % (ref 42–52)
HGB BLD-MCNC: 11.8 G/DL (ref 14–18)
LYMPHOCYTES # BLD: 1.1 K/UL (ref 1–4.8)
LYMPHOCYTES NFR BLD: 19.1 %
MCH RBC QN AUTO: 35.3 PG (ref 27–31.3)
MCHC RBC AUTO-ENTMCNC: 34.8 % (ref 33–37)
MCV RBC AUTO: 101.5 FL (ref 79–92.2)
MONOCYTES # BLD: 0.6 K/UL (ref 0.2–0.8)
MONOCYTES NFR BLD: 10.2 %
NEUTROPHILS # BLD: 3 K/UL (ref 1.4–6.5)
NEUTS SEG NFR BLD: 52.6 %
PLATELET # BLD AUTO: 349 K/UL (ref 130–400)
POTASSIUM SERPL-SCNC: 4.2 MEQ/L (ref 3.4–4.9)
RBC # BLD AUTO: 3.34 M/UL (ref 4.7–6.1)
SODIUM SERPL-SCNC: 136 MEQ/L (ref 135–144)
WBC # BLD AUTO: 5.8 K/UL (ref 4.8–10.8)

## 2025-07-08 PROCEDURE — 2580000003 HC RX 258: Performed by: STUDENT IN AN ORGANIZED HEALTH CARE EDUCATION/TRAINING PROGRAM

## 2025-07-08 PROCEDURE — 36415 COLL VENOUS BLD VENIPUNCTURE: CPT

## 2025-07-08 PROCEDURE — 83036 HEMOGLOBIN GLYCOSYLATED A1C: CPT

## 2025-07-08 PROCEDURE — 87186 SC STD MICRODIL/AGAR DIL: CPT

## 2025-07-08 PROCEDURE — 87075 CULTR BACTERIA EXCEPT BLOOD: CPT

## 2025-07-08 PROCEDURE — 0Y6P0Z1 DETACHMENT AT RIGHT 1ST TOE, HIGH, OPEN APPROACH: ICD-10-PCS

## 2025-07-08 PROCEDURE — 6360000002 HC RX W HCPCS: Performed by: PODIATRIST

## 2025-07-08 PROCEDURE — 2500000003 HC RX 250 WO HCPCS: Performed by: PODIATRIST

## 2025-07-08 PROCEDURE — 7100000010 HC PHASE II RECOVERY - FIRST 15 MIN: Performed by: PODIATRIST

## 2025-07-08 PROCEDURE — 3600000004 HC SURGERY LEVEL 4 BASE: Performed by: PODIATRIST

## 2025-07-08 PROCEDURE — 99222 1ST HOSP IP/OBS MODERATE 55: CPT | Performed by: INTERNAL MEDICINE

## 2025-07-08 PROCEDURE — 7100000011 HC PHASE II RECOVERY - ADDTL 15 MIN: Performed by: PODIATRIST

## 2025-07-08 PROCEDURE — 88311 DECALCIFY TISSUE: CPT

## 2025-07-08 PROCEDURE — 87185 SC STD ENZYME DETCJ PER NZM: CPT

## 2025-07-08 PROCEDURE — 86403 PARTICLE AGGLUT ANTBDY SCRN: CPT

## 2025-07-08 PROCEDURE — 73620 X-RAY EXAM OF FOOT: CPT

## 2025-07-08 PROCEDURE — 3600000014 HC SURGERY LEVEL 4 ADDTL 15MIN: Performed by: PODIATRIST

## 2025-07-08 PROCEDURE — 1210000000 HC MED SURG R&B

## 2025-07-08 PROCEDURE — 85025 COMPLETE CBC W/AUTO DIFF WBC: CPT

## 2025-07-08 PROCEDURE — 6360000002 HC RX W HCPCS: Performed by: STUDENT IN AN ORGANIZED HEALTH CARE EDUCATION/TRAINING PROGRAM

## 2025-07-08 PROCEDURE — 88305 TISSUE EXAM BY PATHOLOGIST: CPT

## 2025-07-08 PROCEDURE — 87076 CULTURE ANAEROBE IDENT EACH: CPT

## 2025-07-08 PROCEDURE — 87176 TISSUE HOMOGENIZATION CULTR: CPT

## 2025-07-08 PROCEDURE — 87077 CULTURE AEROBIC IDENTIFY: CPT

## 2025-07-08 PROCEDURE — 2709999900 HC NON-CHARGEABLE SUPPLY: Performed by: PODIATRIST

## 2025-07-08 PROCEDURE — 28825 PARTIAL AMPUTATION OF TOE: CPT | Performed by: PODIATRIST

## 2025-07-08 PROCEDURE — 80048 BASIC METABOLIC PNL TOTAL CA: CPT

## 2025-07-08 PROCEDURE — 87070 CULTURE OTHR SPECIMN AEROBIC: CPT

## 2025-07-08 PROCEDURE — 2500000003 HC RX 250 WO HCPCS: Performed by: STUDENT IN AN ORGANIZED HEALTH CARE EDUCATION/TRAINING PROGRAM

## 2025-07-08 PROCEDURE — 6370000000 HC RX 637 (ALT 250 FOR IP): Performed by: PODIATRIST

## 2025-07-08 RX ORDER — GINSENG 100 MG
CAPSULE ORAL PRN
Status: DISCONTINUED | OUTPATIENT
Start: 2025-07-08 | End: 2025-07-08 | Stop reason: ALTCHOICE

## 2025-07-08 RX ORDER — BUPIVACAINE HYDROCHLORIDE 5 MG/ML
INJECTION, SOLUTION EPIDURAL; INTRACAUDAL; PERINEURAL PRN
Status: DISCONTINUED | OUTPATIENT
Start: 2025-07-08 | End: 2025-07-08 | Stop reason: ALTCHOICE

## 2025-07-08 RX ORDER — MAGNESIUM HYDROXIDE 1200 MG/15ML
LIQUID ORAL CONTINUOUS PRN
Status: COMPLETED | OUTPATIENT
Start: 2025-07-08 | End: 2025-07-08

## 2025-07-08 RX ORDER — VANCOMYCIN 1.5 G/300ML
1500 INJECTION, SOLUTION INTRAVENOUS EVERY 24 HOURS
Status: DISCONTINUED | OUTPATIENT
Start: 2025-07-08 | End: 2025-07-09

## 2025-07-08 RX ORDER — LIDOCAINE HYDROCHLORIDE 20 MG/ML
INJECTION, SOLUTION EPIDURAL; INFILTRATION; INTRACAUDAL; PERINEURAL PRN
Status: DISCONTINUED | OUTPATIENT
Start: 2025-07-08 | End: 2025-07-08 | Stop reason: ALTCHOICE

## 2025-07-08 RX ADMIN — Medication 10 ML: at 09:38

## 2025-07-08 RX ADMIN — PIPERACILLIN AND TAZOBACTAM 3375 MG: 3; .375 INJECTION, POWDER, LYOPHILIZED, FOR SOLUTION INTRAVENOUS at 09:38

## 2025-07-08 RX ADMIN — Medication 10 ML: at 19:58

## 2025-07-08 RX ADMIN — PIPERACILLIN AND TAZOBACTAM 3375 MG: 3; .375 INJECTION, POWDER, LYOPHILIZED, FOR SOLUTION INTRAVENOUS at 02:12

## 2025-07-08 RX ADMIN — PIPERACILLIN AND TAZOBACTAM 3375 MG: 3; .375 INJECTION, POWDER, LYOPHILIZED, FOR SOLUTION INTRAVENOUS at 17:25

## 2025-07-08 RX ADMIN — VANCOMYCIN 1500 MG: 1.5 INJECTION, SOLUTION INTRAVENOUS at 14:27

## 2025-07-08 ASSESSMENT — PAIN SCALES - GENERAL
PAINLEVEL_OUTOF10: 0

## 2025-07-08 NOTE — PLAN OF CARE
Problem: Discharge Planning  Goal: Discharge to home or other facility with appropriate resources  7/8/2025 0458 by Binta Diamond, RN  Outcome: Progressing  7/7/2025 1716 by Serenity Ramos, RN  Outcome: Progressing  Flowsheets (Taken 7/7/2025 1620)  Discharge to home or other facility with appropriate resources:   Identify barriers to discharge with patient and caregiver   Identify discharge learning needs (meds, wound care, etc)   Refer to discharge planning if patient needs post-hospital services based on physician order or complex needs related to functional status, cognitive ability or social support system   Arrange for needed discharge resources and transportation as appropriate     Problem: Pain  Goal: Verbalizes/displays adequate comfort level or baseline comfort level  7/8/2025 0458 by Binta Diamond, RN  Outcome: Progressing  7/7/2025 1716 by Serenity Ramos, RN  Outcome: Progressing

## 2025-07-08 NOTE — BRIEF OP NOTE
Brief Postoperative Note      Patient: Richard Holman  YOB: 1955  MRN: [id number]    Date of Procedure: 7/8/2025    Pre-Op Diagnosis Codes:      * Osteomyelitis of great toe of right foot (HCC) [M86.9]    Post-Op Diagnosis: Same       Procedure(s):  PARTIAL HALLUX AMPUTATION RIGHT FOOT ROOM 467    Surgeon(s):  Arsen Barr DPM    Assistant:  Resident: Yaneth Bacon DPM; Valente Dumont DPM    Anesthesia: Local    Estimated Blood Loss (mL): Minimal    Complications: None    Specimens:   ID Type Source Tests Collected by Time Destination   1 : right big toe for cultue Tissue Toe CULTURE, SURGICAL Arsen Barr DPM 7/8/2025 1831    A : right big toe Tissue Toe SURGICAL PATHOLOGY Arsen Barr DPM 7/8/2025 1826    B : right toe clean margin Tissue Toe SURGICAL PATHOLOGY Arsen Barr DPM 7/8/2025 1829        Implants:  * No implants in log *      Drains: * No LDAs found *    Findings:  Infection Present At Time Of Surgery (PATOS) (choose all levels that have infection present):  - Deep Infection (muscle/fascia) present as evidenced by fluid consistent with infection  Other Findings: Soft, necrotic, non bleeding, non viable bone  Remaining bone was hard and healthy appearing    Electronically signed by Yaneth Bacon DPM on 7/8/2025 at 7:13 PM

## 2025-07-08 NOTE — CARE COORDINATION
Case Management Assessment  Initial Evaluation    Date/Time of Evaluation: 7/8/2025 10:06 AM  Assessment Completed by: Lexis Carbajal    If patient is discharged prior to next notation, then this note serves as note for discharge by case management.    Patient Name: Richard Holman                   YOB: 1955  Diagnosis: Skin ulcer of toe of right foot with necrosis of bone (HCC) [L97.514]  Osteomyelitis of great toe of right foot (HCC) [M86.9]                   Date / Time: 7/7/2025 11:38 AM    Patient Admission Status: Inpatient   Readmission Risk (Low < 19, Mod (19-27), High > 27): Readmission Risk Score: 10.4    Current PCP: Brigida Denton PA  PCP verified by CM?      Chart Reviewed: Yes      History Provided by:    Patient Orientation:      Patient Cognition:      Hospitalization in the last 30 days (Readmission):  No    If yes, Readmission Assessment in CM Navigator will be completed.    Advance Directives:      Code Status: Full Code   Patient's Primary Decision Maker is: Patient Declined (Legal Next of Kin Remains as Decision Maker)    Primary Decision Maker: Holman,Charlie - Brother/Sister - [phone]    Secondary Decision Maker: Ray Holman - Brother/Sister - [phone]    Discharge Planning:    Patient lives with: Alone Type of Home: Apartment  Primary Care Giver:    Patient Support Systems include: Family Members   Current Financial resources:    Current community resources:    Current services prior to admission: None            Current DME:              Type of Home Care services:  None    ADLS  Prior functional level:    Current functional level:      PT AM-PAC:   /24  OT AM-PAC:   /24    Family can provide assistance at DC:    Would you like Case Management to discuss the discharge plan with any other family members/significant others, and if so, who?    Plans to Return to Present Housing:    Other Identified Issues/Barriers to RETURNING to current housing: NO  Potential  Assistance needed at discharge: N/A            Potential DME:    Patient expects to discharge to: Apartment  Plan for transportation at discharge:      Financial    Payor: GENERIC MANAGED MEDICARE / Plan: GENERIC MANAGED MEDICARE / Product Type: *No Product type* /     Does insurance require precert for SNF: Yes    Potential assistance Purchasing Medications: No  Meds-to-Beds request: Yes      [id number] - [place], [place] - [address] -  [phone] - F [phone] [address] [place] [address]  [place] [address]  Phone: [phone] Fax: [phone]      Notes:    Factors facilitating achievement of predicted outcomes: Cooperative    Barriers to discharge: No family support, Limited family support, Medical complications, and Medication managment    Additional Case Management Notes: PT LIVES HOME ALONE W DOG. PT DRIVES, INDEPENDENT.  PT DENIES DME, O2 AND HD.  PT IS A  BUT NOT CONNECTED. PT DOES HAVE TEACHABLE CAREGIVER IF IV AT ARE NEEDED.     The Plan for Transition of Care is related to the following treatment goals of Skin ulcer of toe of right foot with necrosis of bone (HCC) [L97.514]  Osteomyelitis of great toe of right foot (HCC) [M86.9]    IF APPLICABLE: The Patient and/or patient representative Richard and his family were provided with a choice of provider and agrees with the discharge plan. Freedom of choice list with basic dialogue that supports the patient's individualized plan of care/goals and shares the quality data associated with the providers was provided to:     Patient Representative Name:       The Patient and/or Patient Representative Agree with the Discharge Plan?      Lexis Carbajal  Case Management Department

## 2025-07-08 NOTE — H&P
PODIATRIC HISTORY AND PHYSICAL UPDATE                                            EVALUATION DATE: 7/8/2025   EVALUATION TIME: 5:38 PM    The documented History and Physical (completed in the past 30 days) has been reviewed and the patient had a confirmatory musculoskeletal exam performed. The contents of the pre-operative assesment accurately reflect the patient's condition with the following additions or revisions since the H&P was completed:  No changes.    This H&P can be found in the record dated 7/7/2025.    SIGNATURE: Valente Dumont DPM PATIENT NAME: Richard Holman   DATE: July 8, 2025 MRN: [id number]

## 2025-07-08 NOTE — CARE COORDINATION
PT DOESN'T HAVE COVERAGE FOR HH SERVICES THROUGH HIS MEDICARE.  PT WOULD HAVE TO PAY OOP.  PLAN FOR TOE AMPUTATION, IF THIS TAKES PLACE HE MAY NOT NEED IVS GOING HOME.  WILL CONTINUE TO FOLLOW.

## 2025-07-08 NOTE — ACP (ADVANCE CARE PLANNING)
Advance Care Planning   Healthcare Decision Maker:    Primary Decision Maker: Charlie Holman - Brother/Sister - [phone]    Secondary Decision Maker: Francois Aleman - Kylah - [phone]    Secondary Decision Maker: Ray Holman - Brother/Sister - [phone]    Click here to complete Healthcare Decision Makers including selection of the Healthcare Decision Maker Relationship (ie \"Primary\").  Today we documented Decision Maker(s) consistent with Legal Next of Kin hierarchy.

## 2025-07-08 NOTE — OP NOTE
Operative Note      Patient: Richard Holman  YOB: 1955  MRN: [id number]     Date of Procedure: 7/8/2025     Pre-Op Diagnosis Codes:      * Osteomyelitis of great toe of right foot ([facility]) [M86.9]     Post-Op Diagnosis: Same       Procedure(s):  PARTIAL HALLUX AMPUTATION RIGHT FOOT ROOM 467     Surgeon(s):  Arsen Barr DPM     Assistant:  Resident: Yaneth Bacon DPM; Valente Dumont DPM     Anesthesia: Local     OPERATIVE INDICATIONS: This is a 70 y.o. male who presents with osteomyelitis of the Right hallux, after review of his condition, treatment options, both conservative and surgical, and the prognosis, benefit, potential risks and complications associated with each option.     OPERATIVE FINDINGS: Consistent with post operative diagnosis.    OPERATIVE PROCEDURE: The patient was brought to the operating room and placed on the operating table in the supine position. The foot was then prepped and draped in the usual aseptic manner, and our attention was directed to the foot where a tourni-cot was applied to the right hallux. Our intended incision line was drawn out and following this line our incision was made. The incision was carried deep to the level of the capsule.The toe was disarticulated at the IPJ and the toe was passed from the table, the tendons were distracted distally and severed dorsally and plantarly. The wound was washed with irrisept and subsequently with saline. There did not appear to be any purulence or drainage. The resected toe was split sagittally with half being sent to Microbiology and half being sent to Pathology for evaluation. Next, utilizing an osteotome a clean margin was taken from the distal aspect of the head of the proximal phalanx and passed off of the field and sent to pathology for evaluation. Wound margins were remodeled, deep closure was performed with 2-0 vicryl, and and the tourni-cot was removed just prior to skin closure. Skin was closed primarily with

## 2025-07-08 NOTE — PLAN OF CARE
Problem: Discharge Planning  Goal: Discharge to home or other facility with appropriate resources  7/8/2025 1021 by Serenity Ramos, RN  Outcome: Progressing  7/8/2025 0458 by Binta Diamond, RN  Outcome: Progressing     Problem: Pain  Goal: Verbalizes/displays adequate comfort level or baseline comfort level  7/8/2025 1021 by Serenity Ramos, RN  Outcome: Progressing  7/8/2025 0458 by Binta Diamond, RN  Outcome: Progressing

## 2025-07-08 NOTE — CONSULTS
Infectious Diseases Inpatient Consult Note      Reason for Consult:   Acute osteomyelitis right great toe  Requesting Physician:   Dr. Cowart  Primary Care Physician:  Brigida Denton PA  History Obtained From:   Pt, EPIC    Admit Date: 7/7/2025  Hospital Day: 2      HISTORY OF PRESENT ILLNESS:  This is a 70 y.o. male with past medical history of syncope, daily cigarette smoking, was admitted to [facility]  from home through ER yesterday with concern of a right great toenail coming off with associated right great toe swelling and moderate pain of 3 days duration.  Patient reported draining ulceration at the tip of the toe.  Patient denies any history of diabetes or trauma.  Patient was admitted with infection, started on IV vancomycin and Zosyn.  X-ray with concern for osteomyelitis.  Podiatry is consulted.  No significant fevers or leukocytosis on admission.     No significant past medical or surgical history  Current Medications:     piperacillin-tazobactam  3,375 mg IntraVENous Q8H    vancomycin  1,500 mg IntraVENous Q24H    vancomycin (VANCOCIN) intermittent dosing (placeholder)   Other RX Placeholder    sodium chloride flush  5-40 mL IntraVENous 2 times per day       Allergies:  Patient has no known allergies.    Social History     Socioeconomic History    Marital status: Single     Spouse name: Not on file    Number of children: Not on file    Years of education: Not on file    Highest education level: Not on file   Occupational History    Not on file   Tobacco Use    Smoking status: Every Day     Current packs/day: 0.25     Average packs/day: 0.3 packs/day for 45.0 years (11.3 ttl pk-yrs)     Types: Cigarettes    Smokeless tobacco: Never   Substance and Sexual Activity    Alcohol use: Not Currently    Drug use: Never    Sexual activity: Defer   Other Topics Concern    Not on file   Social History Narrative    Not on file     Social Drivers of Health     Financial Resource Strain: Low Risk  (8/25/2023)    Overall

## 2025-07-08 NOTE — PLAN OF CARE
PODIATRIC MEDICINE AND SURGERY   PLAN OF CARE    NAME: Richard Holman  MRN: [id number]  DATE: July 8, 2025  TIME: 7:18 PM    PLAN AND RECOMMENDATIONS:      Patient is POD #0 s/p partial hallux amputation right foot. Operative site was closed.    Please do not change the right foot dressings.  Podiatry will change the first postop dressing. If strike through noted, nursing can reinforce dressing with ABD, Kerlix, and ACE over top of existing dressing.     Heel WB right LE in a post op shoe. Ok to use walker, crutches, or other assistive device(s) to use the restroom or for Physical Therapy.    Elevate right LE at or above the level of the heart. Prevalon boots on when in bed.    Post-op x-rays ordered for the right foot.    Plan for discharge pending clean margin culture results, first dressing change, and final ID antibiotic recommendations.           Yaneth Bacon DPM PGY-1  July 8, 2025  7:18 PM

## 2025-07-08 NOTE — PROGRESS NOTES
Pharmacy Note  Vancomycin Consult    Richard Holman is a 70 y.o. male started on Vancomycin for bone/joint infection; consult received from Haylie Berry MD  to manage therapy. Also receiving the following antibiotics: Zosyn.    Skin ulcer of toe of right foot with necrosis of bone (HCC) [L97.514]  Osteomyelitis of great toe of right foot (HCC) [M86.9]  Allergies: Patient has no known allergies.    Temp max: 98.4 *F    Cultures  No results for input(s): \"BC\", \"BLOODCULT2\", \"MRSAC\", \"RESPCULTURE\", \"LABGRAM\", \"ORG\", \"CXSURG\", \"WNDABS\", \"LABANAE\", \"LABURIN\", \"SPNEUAGU\", \"HSVSRC\", \"FLUA\", \"FLUB\", \"AFBSMEAR\", \"CXST\", \"FUNGUSSMEAR\", \"LABLEGI\", \"VRECX\", \"CDIFPCR\", \"EPECL821\", \"GIAAGS\", \"ROTA\", \"FECLEU\", \"COVID19\" in the last 720 hours.  Height: 182.9 cm (6'), Weight - Scale: 57.5 kg (126 lb 11.2 oz), Body mass index is 17.18 kg/m².       Recent Labs     07/07/25  1307   CREATININE 0.82   Estimated Creatinine Clearance: 69 mL/min (based on SCr of 0.82 mg/dL).  .    Goal AUC24,ss Level: 400-600 mg/L.hr    Assessment/Plan:  Will initiate vancomycin 1,500 mg IV every 24 hours. LOT not specified. Predicted AUC24,ss 476 mg/L.hr, Ctrough,ss 9.6 mg/L, PAUC 75 %, Pconc 5 %. Random Vancomycin level ordered for 7/9 with morning labs.  Timing of future trough levels will be determined based on culture results, renal function, and clinical response.    Thank you for the consult.  Will continue to follow.  Gabrielle Diamond, PharmD [facility] 7/8/2025 1:00 AM

## 2025-07-09 ENCOUNTER — APPOINTMENT (OUTPATIENT)
Dept: ULTRASOUND IMAGING | Age: 70
DRG: 505 | End: 2025-07-09
Payer: MEDICARE

## 2025-07-09 PROBLEM — I73.9 PAD (PERIPHERAL ARTERY DISEASE): Status: ACTIVE | Noted: 2025-07-09

## 2025-07-09 PROBLEM — L60.2 LONG TOENAIL: Status: ACTIVE | Noted: 2025-07-09

## 2025-07-09 PROBLEM — L97.514 SKIN ULCER OF TOE OF RIGHT FOOT WITH NECROSIS OF BONE (HCC): Status: ACTIVE | Noted: 2025-07-09

## 2025-07-09 LAB
ANION GAP SERPL CALCULATED.3IONS-SCNC: 9 MEQ/L (ref 9–15)
BASOPHILS # BLD: 0.1 K/UL (ref 0–0.2)
BASOPHILS NFR BLD: 0.7 %
BUN SERPL-MCNC: 5 MG/DL (ref 8–23)
CALCIUM SERPL-MCNC: 8.6 MG/DL (ref 8.5–9.9)
CHLORIDE SERPL-SCNC: 102 MEQ/L (ref 95–107)
CO2 SERPL-SCNC: 25 MEQ/L (ref 20–31)
CREAT SERPL-MCNC: 0.82 MG/DL (ref 0.7–1.2)
CRP SERPL HS-MCNC: 75.8 MG/L (ref 0–5)
EOSINOPHIL # BLD: 0.9 K/UL (ref 0–0.7)
EOSINOPHIL NFR BLD: 12.9 %
ERYTHROCYTE [DISTWIDTH] IN BLOOD BY AUTOMATED COUNT: 12.8 % (ref 11.5–14.5)
ERYTHROCYTE [SEDIMENTATION RATE] IN BLOOD BY WESTERGREN METHOD: 44 MM (ref 0–20)
GLUCOSE SERPL-MCNC: 107 MG/DL (ref 70–99)
HCT VFR BLD AUTO: 33.7 % (ref 42–52)
HGB BLD-MCNC: 11.9 G/DL (ref 14–18)
LYMPHOCYTES # BLD: 1.1 K/UL (ref 1–4.8)
LYMPHOCYTES NFR BLD: 16 %
MCH RBC QN AUTO: 36.4 PG (ref 27–31.3)
MCHC RBC AUTO-ENTMCNC: 35.3 % (ref 33–37)
MCV RBC AUTO: 103.1 FL (ref 79–92.2)
MONOCYTES # BLD: 0.7 K/UL (ref 0.2–0.8)
MONOCYTES NFR BLD: 10.3 %
NEUTROPHILS # BLD: 4.2 K/UL (ref 1.4–6.5)
NEUTS SEG NFR BLD: 58.8 %
PLATELET # BLD AUTO: 368 K/UL (ref 130–400)
POTASSIUM SERPL-SCNC: 3.8 MEQ/L (ref 3.4–4.9)
RBC # BLD AUTO: 3.27 M/UL (ref 4.7–6.1)
SODIUM SERPL-SCNC: 136 MEQ/L (ref 135–144)
VANCOMYCIN SERPL-MCNC: 12.7 UG/ML (ref 10–40)
WBC # BLD AUTO: 7.1 K/UL (ref 4.8–10.8)

## 2025-07-09 PROCEDURE — 99232 SBSQ HOSP IP/OBS MODERATE 35: CPT | Performed by: INTERNAL MEDICINE

## 2025-07-09 PROCEDURE — 85025 COMPLETE CBC W/AUTO DIFF WBC: CPT

## 2025-07-09 PROCEDURE — 6360000002 HC RX W HCPCS: Performed by: STUDENT IN AN ORGANIZED HEALTH CARE EDUCATION/TRAINING PROGRAM

## 2025-07-09 PROCEDURE — 99223 1ST HOSP IP/OBS HIGH 75: CPT | Performed by: INTERNAL MEDICINE

## 2025-07-09 PROCEDURE — 36415 COLL VENOUS BLD VENIPUNCTURE: CPT

## 2025-07-09 PROCEDURE — 85652 RBC SED RATE AUTOMATED: CPT

## 2025-07-09 PROCEDURE — 80048 BASIC METABOLIC PNL TOTAL CA: CPT

## 2025-07-09 PROCEDURE — 93925 LOWER EXTREMITY STUDY: CPT

## 2025-07-09 PROCEDURE — 2500000003 HC RX 250 WO HCPCS: Performed by: STUDENT IN AN ORGANIZED HEALTH CARE EDUCATION/TRAINING PROGRAM

## 2025-07-09 PROCEDURE — 80202 ASSAY OF VANCOMYCIN: CPT

## 2025-07-09 PROCEDURE — 6370000000 HC RX 637 (ALT 250 FOR IP)

## 2025-07-09 PROCEDURE — 1210000000 HC MED SURG R&B

## 2025-07-09 PROCEDURE — 86140 C-REACTIVE PROTEIN: CPT

## 2025-07-09 PROCEDURE — 2580000003 HC RX 258: Performed by: STUDENT IN AN ORGANIZED HEALTH CARE EDUCATION/TRAINING PROGRAM

## 2025-07-09 RX ORDER — ASPIRIN 81 MG/1
81 TABLET ORAL DAILY
Status: DISCONTINUED | OUTPATIENT
Start: 2025-07-10 | End: 2025-07-11 | Stop reason: HOSPADM

## 2025-07-09 RX ORDER — ATORVASTATIN CALCIUM 40 MG/1
40 TABLET, FILM COATED ORAL NIGHTLY
Status: DISCONTINUED | OUTPATIENT
Start: 2025-07-09 | End: 2025-07-11 | Stop reason: HOSPADM

## 2025-07-09 RX ADMIN — Medication 10 ML: at 08:31

## 2025-07-09 RX ADMIN — Medication 10 ML: at 21:13

## 2025-07-09 RX ADMIN — ATORVASTATIN CALCIUM 40 MG: 40 TABLET, FILM COATED ORAL at 21:12

## 2025-07-09 RX ADMIN — PIPERACILLIN AND TAZOBACTAM 3375 MG: 3; .375 INJECTION, POWDER, LYOPHILIZED, FOR SOLUTION INTRAVENOUS at 01:12

## 2025-07-09 RX ADMIN — PIPERACILLIN AND TAZOBACTAM 3375 MG: 3; .375 INJECTION, POWDER, LYOPHILIZED, FOR SOLUTION INTRAVENOUS at 08:30

## 2025-07-09 RX ADMIN — PIPERACILLIN AND TAZOBACTAM 3375 MG: 3; .375 INJECTION, POWDER, LYOPHILIZED, FOR SOLUTION INTRAVENOUS at 17:30

## 2025-07-09 ASSESSMENT — ENCOUNTER SYMPTOMS
CHEST TIGHTNESS: 0
VOMITING: 0
SHORTNESS OF BREATH: 0
NAUSEA: 0

## 2025-07-09 ASSESSMENT — PAIN SCALES - GENERAL: PAINLEVEL_OUTOF10: 0

## 2025-07-09 NOTE — PROGRESS NOTES
Infectious Diseases Inpatient Progress Note          HISTORY OF PRESENT ILLNESS:  Follow up acute osteomyelitis of R great toe status post distal toe amputation on IV vancomycin and Zosyn, well tolerated.  Patient denies any pain.  He denies any specific complaints.  No GI symptoms.  No shortness of breath.  No leg swelling.   No rash or mouth soreness.   No fatigue or decreased appetite  No fevers or chills  Current Medications:     piperacillin-tazobactam  3,375 mg IntraVENous Q8H    vancomycin  1,500 mg IntraVENous Q24H    vancomycin (VANCOCIN) intermittent dosing (placeholder)   Other RX Placeholder    sodium chloride flush  5-40 mL IntraVENous 2 times per day       Allergies:  Patient has no known allergies.      Review of Systems  Rest of system review is negative other than HPI    Physical Exam  Vitals:    07/08/25 1912 07/08/25 1957 07/09/25 0208 07/09/25 0727   BP: (!) 144/70 138/79 (!) 115/59 126/79   Pulse: 61 85 68 74   Resp: 16 18 18 18   Temp: 97.1 °F (36.2 °C) 97.5 °F (36.4 °C) 98.1 °F (36.7 °C) 97.7 °F (36.5 °C)   TempSrc: Temporal Oral Oral Oral   SpO2: 100% 97% 98% 91%   Weight:       Height:         General Appearance: alert and oriented to person, place and time, well-developed and well-nourished, in no acute distress  Up in chair  On room air  Skin: warm and dry, no rash.   Head: normocephalic and atraumatic  Eyes: anicteric sclerae  ENT:  normal mucous membranes. No oral thrush  Lungs: normal respiratory effort  Abdomen: soft, no tenderness  No leg edema  Left foot with intact dressing      DATA:    Lab Results   Component Value Date    WBC 7.1 07/09/2025    HGB 11.9 (L) 07/09/2025    HCT 33.7 (L) 07/09/2025    .1 (H) 07/09/2025     07/09/2025     Lab Results   Component Value Date    CREATININE 0.82 07/09/2025    BUN 5 (L) 07/09/2025     07/09/2025    K 3.8 07/09/2025     07/09/2025    CO2 25 07/09/2025       Hepatic Function Panel:  Lab Results   Component Value

## 2025-07-09 NOTE — DISCHARGE INSTRUCTIONS
Podiatry Discharge Instructions    ACTIVITY: Heel Weight Bearing Right Foot in a post op/surgical shoe  - Keep children and pets away from your foot.  - Wear surgical shoe when walking.    DRESSING: Keep surgical area clean and dry. Do NOT remove dressing. You may notice blood upon your bandage. Bleeding is expected and your bandage may become stained. You may reinforce with gauze or ace bandage.     BATHING: Sponge bath only or use of a cast protector in the shower until first post op visit.    ELEVATION: Elevate the operative site above the level of your heart as much as possible.     IF YOU NOTICE:   Fever of 101 degrees or over.  Foul smelling or purulent (pus) drainage  Increase in drainage.  Sudden onset of pain that is unrelieved with pain medication.  Observe operative extremity for circulation problems: change in color, coldness, numbness, or tingling.   If any symptoms occur, Call office immediately or after hours emergency number.      PHONE NUMBERS:   [phone], to contact [facility] with any concerns, during office hours.  [phone], to contact Dr. Barr's office with any concerns, during office hours.  [phone], after office hours and on weekends. You will be directed to the podiatric surgery resident on call.  IN AN EMERGENCY, IF YOU ARE UNABLE TO REACH YOUR PHYSICIAN, GO TO THE NEAREST EMERGENCY ROOM.    FOLLOW UP APPOINTMENT:   [unfilled]

## 2025-07-09 NOTE — PROGRESS NOTES
Hospitalist Progress Note      PCP: Brigida Denton PA    Date of Admission: 7/7/2025    Chief Complaint:    Chief Complaint   Patient presents with    Toe Pain     Right great toenail coming off, not diabetic, states no signs of infection      Subjective:  No acute events overnight. No acute complaints. Denies chest pain or shortness of breath. Reports foot pain is well controlled.    Medications:  Reviewed    Infusion Medications    sodium chloride       Scheduled Medications    [START ON 7/10/2025] aspirin  81 mg Oral Daily    atorvastatin  40 mg Oral Nightly    piperacillin-tazobactam  3,375 mg IntraVENous Q8H    sodium chloride flush  5-40 mL IntraVENous 2 times per day     PRN Meds: sodium chloride flush, sodium chloride, potassium chloride **OR** potassium alternative oral replacement **OR** potassium chloride, magnesium sulfate, ondansetron **OR** ondansetron, polyethylene glycol, acetaminophen **OR** acetaminophen      Intake/Output Summary (Last 24 hours) at 7/9/2025 1447  Last data filed at 7/9/2025 1326  Gross per 24 hour   Intake 1517.83 ml   Output --   Net 1517.83 ml     Exam:  /79   Pulse 74   Temp 97.7 °F (36.5 °C) (Oral)   Resp 18   Ht 1.829 m (6')   Wt 57.5 kg (126 lb 11.2 oz)   SpO2 91%   BMI 17.18 kg/m²   Physical Exam  Cardiovascular:      Rate and Rhythm: Normal rate and regular rhythm.   Pulmonary:      Effort: Pulmonary effort is normal. No respiratory distress.   Abdominal:      Palpations: Abdomen is soft.      Tenderness: There is no abdominal tenderness.   Musculoskeletal:      Comments: RLE dressing c/d/i.   Neurological:      Mental Status: He is alert and oriented to person, place, and time.   Psychiatric:         Mood and Affect: Mood normal.         Behavior: Behavior normal.       Labs:   Recent Labs     07/07/25  1307 07/08/25  0543 07/09/25  0428   WBC 6.5 5.8 7.1   HGB 14.1 11.8* 11.9*   HCT 40.6* 33.9* 33.7*    349 368     Recent Labs     07/07/25  1307

## 2025-07-09 NOTE — PROGRESS NOTES
Pharmacy Vancomycin Consult     Vancomycin Day: 3  Current Dosin,500 mg IV every 24 hours    Temp max: 98.1 *F    Recent Labs     25  0543 25  0428   BUN 5* 5*       Recent Labs     25  0543 25  0428   CREATININE 0.79 0.82       Recent Labs     25  0543 25  0428   WBC 5.8 7.1         Intake/Output Summary (Last 24 hours) at 2025 0518  Last data filed at 2025 0323  Gross per 24 hour   Intake 1173.12 ml   Output --   Net 1173.12 ml       Ht Readings from Last 1 Encounters:   25 1.829 m (6')        Wt Readings from Last 1 Encounters:   25 57.5 kg (126 lb 11.2 oz)         Body mass index is 17.18 kg/m².    Estimated Creatinine Clearance: 69 mL/min (based on SCr of 0.82 mg/dL).    Trough:   Lab Results   Component Value Date/Time    VANCORANDOM 12.7 2025 04:28 AM       Assessment/Plan:  Vancomycin therapeutic with calculated AUC24,ss 490 mg/L.hr, Ctrough,ss 10 mg/L, PAUC 91 %, Pconc 1 %. Continue as currently ordered. Repeat random vancomycin level  with morning labs.    Gabrielle Diamond, PharmD [facility] 2025 5:20 AM

## 2025-07-09 NOTE — CONSULTS
DATE OF CONSULTATION  7/9/2025    CONSULTANT  Dr. Noyola    REQUESTING PHYSICIAN  Haylie Cowart MD     PRIMARY CARDIOLOGIST    REASON FOR CONSULTATION  Chief Complaint   Patient presents with    Toe Pain     Right great toenail coming off, not diabetic, states no signs of infection        Hospital Day: 2       Patient is a 70 y.o. male with past medical history of nicotine dependence who presents with concerns for right toenail coming off.  Patient is followed on a regular basis by Brigida Mackenzie PA.     Cardiology was consulted for PAD.    He initially presented to the emergency department for concerns for his right toenail coming off.  He denies any claudication type symptoms in his legs bilaterally.   Patient found to have osteomyelitis of great toe of right foot. Patient status post partial hallux amputation right foot.   Patient denies any prior cardiac history.     Smokes 1 pack/day.  Denies alcohol or illicit drug use.    Ultrasound lower extremities  IMPRESSION:  There is an area of 25-50% stenosis between the right common femoral artery  and the proximal femoral artery.     The remainder of the exam shows no significant areas of stenosis and or  occlusion.      History reviewed. No pertinent past medical history.   Patient Active Problem List   Diagnosis    Carotid artery stenosis    Eosinophilia    History of colonic polyps    Hyperlipidemia    Nicotine dependence    Other and unspecified alcohol dependence, unspecified drinking behavior    Vitamin B12 deficiency (non anemic)    Syncope and collapse    Osteomyelitis of great toe of right foot (HCC)    Skin ulcer of right great toe with necrosis of bone (HCC)    PAD (peripheral artery disease)    Long toenail       Past Surgical History:   Procedure Laterality Date    TOE AMPUTATION Right 7/8/2025    PARTIAL HALLUX AMPUTATION RIGHT FOOT ROOM 467 performed by Arsen Barr DPM at [facility] OR       Social History     Socioeconomic History    Marital   Mood and Affect: Mood normal.         Behavior: Behavior normal.         LABS:    Pertinent Labs:  CBC:   Recent Labs     07/09/25 0428   WBC 7.1   HGB 11.9*        BMP:  Recent Labs     07/09/25 0428      K 3.8      CO2 25   BUN 5*   CREATININE 0.82   GLUCOSE 107*   LABGLOM >90.0     INR: No results for input(s): \"INR\" in the last 72 hours.  BNP: No results for input(s): \"BNP\" in the last 72 hours.   TSH:   Lab Results   Component Value Date    TSH 3.410 08/08/2023      Cardiac Injury Profile: No results for input(s): \"CKTOTAL\", \"CKMB\", \"CKMBINDEX\", \"TROPONINI\" in the last 72 hours.   Troponin:   Lab Results   Component Value Date/Time    TROPONINI <0.010 08/08/2023 12:30 PM     Lipid Profile:   Lab Results   Component Value Date/Time    TRIG 105 08/25/2023 10:52 AM    HDL 48 08/25/2023 10:52 AM    CHOL 263 08/25/2023 10:52 AM      Hemoglobin A1C: No components found for: \"HGBA1C\"       Radiology:  Vascular duplex lower extremity arteries bilateral  Result Date: 7/9/2025  EXAMINATION: ARTERIAL DUPLEX ULTRASOUND OF THE BILATERAL LOWER EXTREMITIES  7/9/2025 10:21 am TECHNIQUE: Duplex ultrasound using B-mode/gray scaled imaging, Doppler spectral analysis and color flow Doppler was obtained of the bilateral lower extremities. COMPARISON: None. HISTORY: ORDERING SYSTEM PROVIDED HISTORY: DIMINISHED PULSES, LEG, Ulceration TECHNOLOGIST PROVIDED HISTORY: What reading provider will be dictating this exam?->CRC FINDINGS: Right: There is variable waveform seen throughout the visualized arterial extent of the right lower extremity. Flow velocities were measured as follows: Com. Fem.  229 cm/sec SFA Prox.    120 cm/sec SFA Mid.     210 cm/sec SFA Dist.     200 cm/sec Pop.           143 cm/sec PTA           124 cm/sec Peron.        Sub visualized ANA MARIA 136  cm/sec Left: There is variable phasicity seen throughout the visualized arterial extent of the left lower extremity. Flow velocities were measured as

## 2025-07-09 NOTE — PROGRESS NOTES
Pt back up to floor. VSS on RA. Ace dressing to right foot CDI. Educated pt to  keep foot elevated with ice and to heel WB. Pt eating dinner tray, denies any pain or any needs at this time. Safety maintained. Call light within reach.

## 2025-07-09 NOTE — PLAN OF CARE
Problem: Discharge Planning  Goal: Discharge to home or other facility with appropriate resources  7/8/2025 2007 by Jessica Jones RN  Outcome: Progressing  7/8/2025 1021 by Serenity Ramos RN  Outcome: Progressing     Problem: Pain  Goal: Verbalizes/displays adequate comfort level or baseline comfort level  7/8/2025 2007 by Jessica Jones RN  Outcome: Progressing  7/8/2025 1021 by Serenity Ramos, RN  Outcome: Progressing

## 2025-07-09 NOTE — PROGRESS NOTES
HCT 33.7 (L) 07/09/2025    .1 (H) 07/09/2025     07/09/2025     Lab Results   Component Value Date/Time     07/09/2025 04:28 AM    K 3.8 07/09/2025 04:28 AM     07/09/2025 04:28 AM    CO2 25 07/09/2025 04:28 AM    BUN 5 07/09/2025 04:28 AM    CREATININE 0.82 07/09/2025 04:28 AM    GLUCOSE 107 07/09/2025 04:28 AM    CALCIUM 8.6 07/09/2025 04:28 AM      No results found for: \"LABPROT\", \"LABALBU\"  Lab Results   Component Value Date    SEDRATE 44 (H) 07/09/2025     Lab Results   Component Value Date    CRP 75.8 (H) 07/09/2025     Lab Results   Component Value Date    LABA1C 5.3 07/08/2025       MICROBIOLOGY:     Results       Procedure Component Value Units Date/Time    Culture, Surgical [[id number]] Collected: 07/08/25 1831    Order Status: Sent Specimen: Tissue from Toe Updated: 07/08/25 1903    Culture, Anaerobic and Aerobic [[id number]] Collected: 07/07/25 1448    Order Status: Completed Specimen: Toe Updated: 07/09/25 1113     CULTURE WOUND --     Direct Exam:  NO NEUTROPHILS SEEN  Direct Exam:  FEW GRAM NEGATIVE RODS  Direct Exam:  RARE GRAM POSITIVE COCCI IN PAIRS  Cult,Aerobe/Anaerobe:  NORMAL SKIN DONTA  Cult,Aerobe/Anaerobe:  No anaerobic organisms isolated at 2 days.  Performed at [place] [id number] ([phone]      Narrative:      ORDER#: [id number]                          ORDERED BY: BHAVESH TIDWELL  SOURCE: Toe                                COLLECTED:  07/07/25 14:48  ANTIBIOTICS AT EH.:                      RECEIVED :  07/07/25 14:48    Culture, Wound (with Gram Stain) [[id number]] Collected: 07/07/25 1442    Order Status: Completed Specimen: Toe Updated: 07/08/25 0954     WOUND/ABSCESS --     Cult,Wound:  DUPLICATE ORDER  Performed at [address] [id number] ([phone]      Narrative:      ORDER#: [id number]                          ORDERED BY: BHAVESH TIDWELL  SOURCE: Toe                                 Lisfranc joint is intact.  Degenerative changes of the 1st  tarsometatarsal joint.  A bandage is noted overlying the the finding.     IMPRESSION:  Amputation of the 1st distal phalanx tuft. Soft tissue irregularity in this  region is noted. Air is noted along the margin of the tuft of line.  Findings  are be compatible with osteomyelitis as evidenced by deformity of the tuft of  the 2nd digit.        Valente Dumont DPM PGY-2  Podiatric Surgery Resident  Podiatry On Call Pager: [phone]  July 9, 2025  4:15 PM

## 2025-07-09 NOTE — PROGRESS NOTES
Hospitalist Progress Note      PCP: Brigida Denton PA    Date of Admission: 7/7/2025    Chief Complaint:    Chief Complaint   Patient presents with    Toe Pain     Right great toenail coming off, not diabetic, states no signs of infection      Subjective:  No acute events overnight. Pt resting comfortably in bed. No acute complaints. Denies chest pain or shortness of breath.    Medications:  Reviewed    Infusion Medications    sodium chloride       Scheduled Medications    piperacillin-tazobactam  3,375 mg IntraVENous Q8H    vancomycin  1,500 mg IntraVENous Q24H    vancomycin (VANCOCIN) intermittent dosing (placeholder)   Other RX Placeholder    sodium chloride flush  5-40 mL IntraVENous 2 times per day     PRN Meds: sodium chloride flush, sodium chloride, potassium chloride **OR** potassium alternative oral replacement **OR** potassium chloride, magnesium sulfate, ondansetron **OR** ondansetron, polyethylene glycol, acetaminophen **OR** acetaminophen      Intake/Output Summary (Last 24 hours) at 7/9/2025 0530  Last data filed at 7/9/2025 0323  Gross per 24 hour   Intake 1173.12 ml   Output --   Net 1173.12 ml     Exam:  BP (!) 115/59   Pulse 68   Temp 98.1 °F (36.7 °C) (Oral)   Resp 18   Ht 1.829 m (6')   Wt 57.5 kg (126 lb 11.2 oz)   SpO2 98%   BMI 17.18 kg/m²   Physical Exam  Cardiovascular:      Rate and Rhythm: Normal rate and regular rhythm.   Pulmonary:      Effort: Pulmonary effort is normal. No respiratory distress.   Abdominal:      Palpations: Abdomen is soft.      Tenderness: There is no abdominal tenderness.   Skin:     Comments: R 1st toe with large overgrown toenail, small puncture-like wound at the distal aspect of the toe with noted purulent drainage, no surrounding erythema.   Neurological:      Mental Status: He is alert and oriented to person, place, and time.   Psychiatric:         Mood and Affect: Mood normal.         Behavior: Behavior normal.       Labs:   Recent Labs

## 2025-07-10 ENCOUNTER — TELEPHONE (OUTPATIENT)
Age: 70
End: 2025-07-10

## 2025-07-10 LAB
ANION GAP SERPL CALCULATED.3IONS-SCNC: 11 MEQ/L (ref 9–15)
BASOPHILS # BLD: 0 K/UL (ref 0–0.2)
BASOPHILS NFR BLD: 0.3 %
BUN SERPL-MCNC: 6 MG/DL (ref 8–23)
CALCIUM SERPL-MCNC: 8.6 MG/DL (ref 8.5–9.9)
CHLORIDE SERPL-SCNC: 98 MEQ/L (ref 95–107)
CO2 SERPL-SCNC: 24 MEQ/L (ref 20–31)
CREAT SERPL-MCNC: 0.82 MG/DL (ref 0.7–1.2)
EOSINOPHIL # BLD: 0.8 K/UL (ref 0–0.7)
EOSINOPHIL NFR BLD: 13.2 %
ERYTHROCYTE [DISTWIDTH] IN BLOOD BY AUTOMATED COUNT: 13.2 % (ref 11.5–14.5)
GLUCOSE SERPL-MCNC: 94 MG/DL (ref 70–99)
HCT VFR BLD AUTO: 34 % (ref 42–52)
HGB BLD-MCNC: 11.6 G/DL (ref 14–18)
LYMPHOCYTES # BLD: 1.6 K/UL (ref 1–4.8)
LYMPHOCYTES NFR BLD: 26.2 %
MAGNESIUM SERPL-MCNC: 1.7 MG/DL (ref 1.7–2.4)
MCH RBC QN AUTO: 35.3 PG (ref 27–31.3)
MCHC RBC AUTO-ENTMCNC: 34.1 % (ref 33–37)
MCV RBC AUTO: 103.3 FL (ref 79–92.2)
MONOCYTES # BLD: 0.7 K/UL (ref 0.2–0.8)
MONOCYTES NFR BLD: 11.1 %
NEUTROPHILS # BLD: 3 K/UL (ref 1.4–6.5)
NEUTS SEG NFR BLD: 47.8 %
PLATELET # BLD AUTO: 381 K/UL (ref 130–400)
POTASSIUM SERPL-SCNC: 3 MEQ/L (ref 3.4–4.9)
POTASSIUM SERPL-SCNC: 4 MEQ/L (ref 3.4–4.9)
RBC # BLD AUTO: 3.29 M/UL (ref 4.7–6.1)
SODIUM SERPL-SCNC: 133 MEQ/L (ref 135–144)
WBC # BLD AUTO: 6.2 K/UL (ref 4.8–10.8)

## 2025-07-10 PROCEDURE — 84132 ASSAY OF SERUM POTASSIUM: CPT

## 2025-07-10 PROCEDURE — 1210000000 HC MED SURG R&B

## 2025-07-10 PROCEDURE — 36415 COLL VENOUS BLD VENIPUNCTURE: CPT

## 2025-07-10 PROCEDURE — 2500000003 HC RX 250 WO HCPCS: Performed by: STUDENT IN AN ORGANIZED HEALTH CARE EDUCATION/TRAINING PROGRAM

## 2025-07-10 PROCEDURE — 85025 COMPLETE CBC W/AUTO DIFF WBC: CPT

## 2025-07-10 PROCEDURE — 2580000003 HC RX 258

## 2025-07-10 PROCEDURE — 6360000002 HC RX W HCPCS: Performed by: STUDENT IN AN ORGANIZED HEALTH CARE EDUCATION/TRAINING PROGRAM

## 2025-07-10 PROCEDURE — 99232 SBSQ HOSP IP/OBS MODERATE 35: CPT | Performed by: INTERNAL MEDICINE

## 2025-07-10 PROCEDURE — 80048 BASIC METABOLIC PNL TOTAL CA: CPT

## 2025-07-10 PROCEDURE — 2500000003 HC RX 250 WO HCPCS

## 2025-07-10 PROCEDURE — 2580000003 HC RX 258: Performed by: STUDENT IN AN ORGANIZED HEALTH CARE EDUCATION/TRAINING PROGRAM

## 2025-07-10 PROCEDURE — 83735 ASSAY OF MAGNESIUM: CPT

## 2025-07-10 PROCEDURE — 6370000000 HC RX 637 (ALT 250 FOR IP)

## 2025-07-10 PROCEDURE — 6360000002 HC RX W HCPCS

## 2025-07-10 RX ADMIN — PIPERACILLIN AND TAZOBACTAM 3375 MG: 3; .375 INJECTION, POWDER, LYOPHILIZED, FOR SOLUTION INTRAVENOUS at 16:56

## 2025-07-10 RX ADMIN — POTASSIUM CHLORIDE 10 MEQ: 7.46 INJECTION, SOLUTION INTRAVENOUS at 12:50

## 2025-07-10 RX ADMIN — ASPIRIN 81 MG: 81 TABLET, COATED ORAL at 07:46

## 2025-07-10 RX ADMIN — PIPERACILLIN AND TAZOBACTAM 3375 MG: 3; .375 INJECTION, POWDER, LYOPHILIZED, FOR SOLUTION INTRAVENOUS at 00:51

## 2025-07-10 RX ADMIN — SODIUM CHLORIDE: 0.9 INJECTION, SOLUTION INTRAVENOUS at 06:56

## 2025-07-10 RX ADMIN — POTASSIUM CHLORIDE 10 MEQ: 7.46 INJECTION, SOLUTION INTRAVENOUS at 07:49

## 2025-07-10 RX ADMIN — POTASSIUM CHLORIDE 10 MEQ: 7.46 INJECTION, SOLUTION INTRAVENOUS at 11:46

## 2025-07-10 RX ADMIN — Medication 10 ML: at 07:46

## 2025-07-10 RX ADMIN — POTASSIUM CHLORIDE 10 MEQ: 7.46 INJECTION, SOLUTION INTRAVENOUS at 08:50

## 2025-07-10 RX ADMIN — ATORVASTATIN CALCIUM 40 MG: 40 TABLET, FILM COATED ORAL at 22:13

## 2025-07-10 RX ADMIN — PIPERACILLIN AND TAZOBACTAM 3375 MG: 3; .375 INJECTION, POWDER, LYOPHILIZED, FOR SOLUTION INTRAVENOUS at 08:56

## 2025-07-10 RX ADMIN — POTASSIUM CHLORIDE 10 MEQ: 7.46 INJECTION, SOLUTION INTRAVENOUS at 10:45

## 2025-07-10 RX ADMIN — Medication 10 ML: at 22:13

## 2025-07-10 RX ADMIN — POTASSIUM CHLORIDE 10 MEQ: 7.46 INJECTION, SOLUTION INTRAVENOUS at 06:42

## 2025-07-10 ASSESSMENT — ENCOUNTER SYMPTOMS
SHORTNESS OF BREATH: 0
CHEST TIGHTNESS: 0
NAUSEA: 0
VOMITING: 0

## 2025-07-10 NOTE — PROGRESS NOTES
Infectious Diseases Inpatient Progress Note          HISTORY OF PRESENT ILLNESS:  Follow up acute osteomyelitis of R great toe status post distal toe amputation on IV Zosyn, well tolerated.  Patient denies any pain.  He denies any specific complaints.  No GI symptoms.  No shortness of breath.  Mild right foot and leg swelling.   Currently with right upper extremity infiltrated IV site related to potassium infusion.   Status post distal toe amputation  No rash or mouth soreness.   No fatigue or decreased appetite  No fevers or chills  Current Medications:     aspirin  81 mg Oral Daily    atorvastatin  40 mg Oral Nightly    piperacillin-tazobactam  3,375 mg IntraVENous Q8H    sodium chloride flush  5-40 mL IntraVENous 2 times per day       Allergies:  Patient has no known allergies.      Review of Systems  Rest of system review is negative other than HPI    Physical Exam  Vitals:    07/09/25 1549 07/09/25 1925 07/10/25 0225 07/10/25 0813   BP: 127/72 124/71 125/79 100/76   Pulse: 73 73 66 78   Resp: 18 18 18 18   Temp: 97.9 °F (36.6 °C) 97.9 °F (36.6 °C) 97.7 °F (36.5 °C) 97.5 °F (36.4 °C)   TempSrc: Oral Oral Oral Oral   SpO2: 100% 100% 99% 100%   Weight:       Height:         General Appearance: alert and oriented to person, place and time, well-developed and well-nourished, in no acute distress  Up in chair  On room air  Skin: warm and dry, no rash.   Head: normocephalic and atraumatic  Eyes: anicteric sclerae  ENT:  normal mucous membranes. No oral thrush  Lungs: normal respiratory effort  Abdomen: soft, no tenderness  No leg edema  R great toe status post distal amputation with intact sutures.   Right great toe swelling.  Right foot and leg swelling  Right foot plantar aspect with dry callus  Photos were obtained with patient's permission            DATA:    Lab Results   Component Value Date    WBC 6.2 07/10/2025    HGB 11.6 (L) 07/10/2025    HCT 34.0 (L) 07/10/2025    .3 (H) 07/10/2025

## 2025-07-10 NOTE — PROGRESS NOTES
DATE OF CONSULTATION  7/10/2025    CONSULTANT  Dr. Noyola    REQUESTING PHYSICIAN  Haylie Cowart MD     PRIMARY CARDIOLOGIST    REASON FOR CONSULTATION  Chief Complaint   Patient presents with    Toe Pain     Right great toenail coming off, not diabetic, states no signs of infection        Hospital Day: 3       Patient is a 70 y.o. male with past medical history of nicotine dependence who presents with concerns for right toenail coming off.  Patient is followed on a regular basis by Brigida Mackenzie PA.     Cardiology was consulted for PAD.    He initially presented to the emergency department for concerns for his right toenail coming off.  He denies any claudication type symptoms in his legs bilaterally.   Patient found to have osteomyelitis of great toe of right foot. Patient status post partial hallux amputation right foot.   Patient denies any prior cardiac history.     Smokes 1 pack/day.  Denies alcohol or illicit drug use.    Ultrasound lower extremities  IMPRESSION:  There is an area of 25-50% stenosis between the right common femoral artery  and the proximal femoral artery.     The remainder of the exam shows no significant areas of stenosis and or  occlusion.  ============  Hospital course  7/10/2025  Patient sitting in a chair  Reports feeling well denies chest pain or shortness of breath  Patient anxious to leave the hospital      History reviewed. No pertinent past medical history.   Patient Active Problem List   Diagnosis    Carotid artery stenosis    Eosinophilia    History of colonic polyps    Hyperlipidemia    Nicotine dependence    Other and unspecified alcohol dependence, unspecified drinking behavior    Vitamin B12 deficiency (non anemic)    Syncope and collapse    Osteomyelitis of great toe of right foot (HCC)    Skin ulcer of right great toe with necrosis of bone (HCC)    PAD (peripheral artery disease)    Long toenail       Past Surgical History:   Procedure Laterality Date    TOE  throughout the visualized arterial extent of the right lower extremity. Flow velocities were measured as follows: Com. Fem.  229 cm/sec SFA Prox.    120 cm/sec SFA Mid.     210 cm/sec SFA Dist.     200 cm/sec Pop.           143 cm/sec PTA           124 cm/sec Peron.        Sub visualized ANA MARIA 136  cm/sec Left: There is variable phasicity seen throughout the visualized arterial extent of the left lower extremity. Flow velocities were measured as follows: Com. Fem.  126 cm/sec SFA Prox.    188 cm/sec SFA Mid.     184 cm/sec SFA Dist.     150 cm/sec Pop.           73 cm/sec PTA           96 cm/sec Peron.  Sub visualized ANA MARIA           78 cm/sec     There is an area of 25-50% stenosis between the right common femoral artery and the proximal femoral artery. The remainder of the exam shows no significant areas of stenosis and or occlusion.     XR FOOT RIGHT (2 VIEWS)  Result Date: 7/8/2025  EXAMINATION: TWO XRAY VIEWS OF THE RIGHT FOOT 7/8/2025 7:24 pm COMPARISON: 07/07/2025 HISTORY: ORDERING SYSTEM PROVIDED HISTORY: post op TECHNOLOGIST PROVIDED HISTORY: Reason for exam:->post op What reading provider will be dictating this exam?->CRC FINDINGS: Status post interval resection of the great toe distal phalanx.  There is soft tissue gas and swelling in the surgical bed.     Status post interval resection of the great toe distal phalanx.     XR FOOT RIGHT (MIN 3 VIEWS)  Result Date: 7/7/2025  EXAMINATION: THREE XRAY VIEWS OF THE RIGHT FOOT 7/7/2025 1:30 pm COMPARISON: None. HISTORY: ORDERING SYSTEM PROVIDED HISTORY: right hallux wound TECHNOLOGIST PROVIDED HISTORY: Reason for exam:->right hallux wound What reading provider will be dictating this exam?->CRC FINDINGS: Amputation of the 1st distal phalanx tuft.  Soft tissue irregularity in this region is noted.  Air is noted along the margin of the tuft of line. The Lisfranc joint is intact.  Degenerative changes of the 1st tarsometatarsal joint.  A bandage is noted overlying the

## 2025-07-10 NOTE — CARE COORDINATION
IV BENEFIT REQUEST FORM    FAX FROM: [facility]                        [address] [place] [id number]    REQUESTED BY: Electronically signed by Faye Palomino RN on 7/10/2025 at 7:41 AM                                               RN/C3: PHONE: [phone])     DATE:/TIME OF REQUEST: 07/10/25  TIME: 7:41 AM      TO: [facility]      FAX TO: [phone]    PHONE: [phone]     THIS PATIENT HAS BEEN IDENTIFIED TO POSSIBLY NEED LONG TERM IV'S.    PLEASE CHECK INSURANCE COVERAGE FOR THE FOLLOWING PT/DRUGS.    PATIENT'S NAME: REJI QUEEN                              [facility]   PATIENT'S : 1955  PATIENT ADDRESS: [address] [address]  SSN:    ([id number])     PAYOR NAME:  Payor: GENERIC MANAGED MEDICARE / Plan: GENERIC MANAGED MEDICARE / Product Type: *No Product type* /     DRUG: (ZOSYN)                         DOSE: (3,375MG)            FREQUENCY: Q (8) HR    *IF [facility] IS NOT A PROVIDER FOR THIS PATIENT, PLEASE FORWARD INFO VIA FAX TO [facility] @ [phone],(PHONE NUMBER: [phone]) TO RUN BENEFIT VERIFICATION AND NOTIFY THE ABOVE C3 OF THIS PLAN.    (FAX FACE SHEET WITH DEMOGRAPHICS AND INSURANCE INFO WITH THIS FORM.)  PLEASE FAX BENEFIT INFO TO: THE [facility] [phone] 4W FLOOR    This message is intended only for the use of the individual or entity to which it is addressed and may contain information that is privileged, confidential, and exempt from disclosure under applicable law. If the reader of the notice is not intended recipient of the employee/agent responsible for delivering the message to the intended recipient, you are hereby notified than any dissemination, distribution or copying of this communication is strictly prohibited. Please contact the sender for further instructions on handling the information.

## 2025-07-10 NOTE — PROGRESS NOTES
Spiritual Health History and Assessment/Progress Note  [facility]    Initial Encounter,  ,  ,      Name: Richard Holman MRN: [id number]    Age: 70 y.o.     Sex: male   Language: English   Rastafari: None   Osteomyelitis of great toe of right foot (HCC)     Date: 7/10/2025            Total Time Calculated: 25 min              Pt alert, calm, upbeat, feeling better, and coping well.  Pt pleasant, positive, and engaged in conversation.  Pt supported by siblings, children and friends/neighbors. Pt has no theresa tradition, receptive to prayer and expressed thanks for visit.  Pt hopeful to return home tomorrow, looking forward to reuniting with his dog Bear.   remains available for support.    Spiritual [place] [facility]        Referral/Consult From: Rounding   Encounter Overview/Reason: Initial Encounter  Service Provided For: Patient    Theresa, Belief, Meaning:   Patient has beliefs or practices that help with coping during difficult times  Family/Friends No family/friends present      Importance and Influence:  Patient has spiritual/personal beliefs that influence decisions regarding their health  Family/Friends No family/friends present    Community:  Patient feels well-supported. Support system includes: Children, Friends, and Extended family  Family/Friends No family/friends present    Assessment and Plan of Care:     Patient Interventions include: Facilitated expression of thoughts and feelings, Explored spiritual coping/struggle/distress, Affirmed coping skills/support systems, Provided sacramental/Evangelical ritual, and Facilitated life review and/ or legacy  Family/Friends Interventions include: No family/friends present    Patient Plan of Care: Spiritual Care available upon further referral  Family/Friends Plan of Care: No family/friends present    Electronically signed by Katharina Asher  Intern on 7/10/2025 at 4:29 PM

## 2025-07-10 NOTE — CARE COORDINATION
SPOKE W/EVON FROM [facility] BY [facility] AND THEY ARE FOLLOWING. START OF CARE 7/12/25. AWAITING ID FINAL PLAN, POSSIBLE PICC LINE. CURRENTLY ON ZOSYN. PER ID, CHECK SURGICAL WOUND CULTURES FOR DECISION ON ORAL ABX ON DISCHARGE.

## 2025-07-10 NOTE — TELEPHONE ENCOUNTER
SCHEDULED POST OP PER DR. SHERWOOD.    7/16/25 AT 3:15.  PATIENT NOTIFIED, AND МАРИНА ON     4W NURSES STATION OF POST OP APPT.

## 2025-07-10 NOTE — PLAN OF CARE
Problem: Discharge Planning  Goal: Discharge to home or other facility with appropriate resources  7/9/2025 2119 by Jessica Jones, RN  Outcome: Progressing  7/9/2025 1732 by Lainey Johnson, RN  Outcome: Progressing  Flowsheets (Taken 7/9/2025 0826)  Discharge to home or other facility with appropriate resources:   Identify barriers to discharge with patient and caregiver   Arrange for needed discharge resources and transportation as appropriate   Identify discharge learning needs (meds, wound care, etc)     Problem: Pain  Goal: Verbalizes/displays adequate comfort level or baseline comfort level  7/9/2025 2119 by Jessica Jones, RN  Outcome: Progressing  7/9/2025 1732 by Lainey Johnson, RN  Outcome: Progressing

## 2025-07-10 NOTE — CARE COORDINATION
RECEIVED CALL FROM [facility] REQUESTING CLINICALS, FACE  SHEET AND ABX PRESCRIPTION IF AVAILABLE TO BE FAXED [phone].

## 2025-07-11 VITALS
RESPIRATION RATE: 18 BRPM | DIASTOLIC BLOOD PRESSURE: 70 MMHG | BODY MASS INDEX: 17.13 KG/M2 | SYSTOLIC BLOOD PRESSURE: 111 MMHG | HEART RATE: 76 BPM | OXYGEN SATURATION: 100 % | HEIGHT: 72 IN | WEIGHT: 126.5 LBS | TEMPERATURE: 97.3 F

## 2025-07-11 LAB
ANION GAP SERPL CALCULATED.3IONS-SCNC: 6 MEQ/L (ref 9–15)
BUN SERPL-MCNC: 9 MG/DL (ref 8–23)
CALCIUM SERPL-MCNC: 8.8 MG/DL (ref 8.5–9.9)
CHLORIDE SERPL-SCNC: 104 MEQ/L (ref 95–107)
CO2 SERPL-SCNC: 24 MEQ/L (ref 20–31)
CREAT SERPL-MCNC: 0.83 MG/DL (ref 0.7–1.2)
GLUCOSE SERPL-MCNC: 92 MG/DL (ref 70–99)
POTASSIUM SERPL-SCNC: 4 MEQ/L (ref 3.4–4.9)
SODIUM SERPL-SCNC: 134 MEQ/L (ref 135–144)

## 2025-07-11 PROCEDURE — 99232 SBSQ HOSP IP/OBS MODERATE 35: CPT | Performed by: INTERNAL MEDICINE

## 2025-07-11 PROCEDURE — 80048 BASIC METABOLIC PNL TOTAL CA: CPT

## 2025-07-11 PROCEDURE — 6370000000 HC RX 637 (ALT 250 FOR IP)

## 2025-07-11 PROCEDURE — 6360000002 HC RX W HCPCS

## 2025-07-11 PROCEDURE — 36415 COLL VENOUS BLD VENIPUNCTURE: CPT

## 2025-07-11 PROCEDURE — 2580000003 HC RX 258

## 2025-07-11 RX ORDER — ASPIRIN 81 MG/1
81 TABLET ORAL DAILY
Qty: 30 TABLET | Refills: 0 | Status: SHIPPED | OUTPATIENT
Start: 2025-07-12

## 2025-07-11 RX ORDER — ATORVASTATIN CALCIUM 40 MG/1
40 TABLET, FILM COATED ORAL NIGHTLY
Qty: 30 TABLET | Refills: 0 | Status: SHIPPED | OUTPATIENT
Start: 2025-07-11

## 2025-07-11 RX ADMIN — PIPERACILLIN AND TAZOBACTAM 3375 MG: 3; .375 INJECTION, POWDER, LYOPHILIZED, FOR SOLUTION INTRAVENOUS at 10:02

## 2025-07-11 RX ADMIN — PIPERACILLIN AND TAZOBACTAM 3375 MG: 3; .375 INJECTION, POWDER, LYOPHILIZED, FOR SOLUTION INTRAVENOUS at 00:59

## 2025-07-11 RX ADMIN — ASPIRIN 81 MG: 81 TABLET, COATED ORAL at 09:00

## 2025-07-11 NOTE — FLOWSHEET NOTE
Patient is alert and oriented,very pleasant when greeted,his respirations were effortless,he denies pain or shortness of breath.    0630 Patient slept most of the night,he is resting in bed,no respiratory distress,his call light is within his easy reach.

## 2025-07-11 NOTE — PROGRESS NOTES
Infectious Diseases Inpatient Progress Note          HISTORY OF PRESENT ILLNESS:  Follow up acute osteomyelitis of R great toe status post distal toe amputation on IV Zosyn, well tolerated.  Patient denies any pain.  He denies any specific complaints.  No GI/ or pulmonary symptoms.  No shortness of breath.  Decreasing right foot and leg swelling.   Status post distal toe amputation with clean margins as discussed with podiatry resident  No rash or mouth soreness.   No fatigue or decreased appetite  No fevers or chills  Current Medications:     aspirin  81 mg Oral Daily    atorvastatin  40 mg Oral Nightly    piperacillin-tazobactam  3,375 mg IntraVENous Q8H    sodium chloride flush  5-40 mL IntraVENous 2 times per day       Allergies:  Patient has no known allergies.      Review of Systems  Rest of system review is negative other than HPI    Physical Exam  Vitals:    07/10/25 2010 07/11/25 0052 07/11/25 0059 07/11/25 0757   BP:   133/67 114/81   Pulse: 68  58 76   Resp:   16 18   Temp:   97.5 °F (36.4 °C) 97.7 °F (36.5 °C)   TempSrc:    Oral   SpO2:   100% 99%   Weight:  57.4 kg (126 lb 8 oz)     Height:         General Appearance: alert and oriented to person, place and time, well-developed and well-nourished, in no acute distress  Up in chair  On room air  Skin: warm and dry, no rash.   Head: normocephalic and atraumatic  Eyes: anicteric sclerae  ENT:  normal mucous membranes. No oral thrush  Lungs: normal respiratory effort  Abdomen: soft, no tenderness  No leg edema  R foot with intact dressing      DATA:    Lab Results   Component Value Date    WBC 6.2 07/10/2025    HGB 11.6 (L) 07/10/2025    HCT 34.0 (L) 07/10/2025    .3 (H) 07/10/2025     07/10/2025     Lab Results   Component Value Date    CREATININE 0.83 07/11/2025    BUN 9 07/11/2025     (L) 07/11/2025    K 4.0 07/11/2025     07/11/2025    CO2 24 07/11/2025       Hepatic Function Panel:  Lab Results   Component Value Date/Time     ALKPHOS 28 07/07/2025 01:07 PM    ALT 8 07/07/2025 01:07 PM    AST 25 07/07/2025 01:07 PM    BILITOT 0.8 07/07/2025 01:07 PM       Microbiology:   IntraOp cultures with mixed skin mark  Arterial duplex with 25 to 50% stenosis between the right common femoral artery and proximal femoral artery  The remainder of exam was negative  IMPRESSION:  There is an area of 25-50% stenosis between the right common femoral artery  and the proximal femoral artery.     The remainder of the exam shows no significant areas of stenosis and or  occlusion.    IMPRESSION:    Acute osteomyelitis right great toe, status post distal toe amputation  Right hallux ulceration with bone necrosis of the distal phalanx  Peripheral arterial disease  Long toenail, chronic cigarette smoking, concern for peripheral arterial disease        PLAN:  DC IV Zosyn on discharge  May discharge on Augmentin 875 mg twice daily for 2 weeks  Follow-up in 2 weeks  Follow-up with podiatry with local wound care    Discussed with patient and RN    Mell Abad MD

## 2025-07-11 NOTE — PROGRESS NOTES
CLINICAL PHARMACY NOTE: MEDS TO BEDS    Total # of Prescriptions Filled: 3   The following medications were delivered to the patient:  Aspirin 81 mg Tab  Atorvastatin 40 mg Tab  Amoxic-Pot Cla 875-125 mg Tab    Additional Documentation:

## 2025-07-11 NOTE — PROGRESS NOTES
Hospitalist Progress Note      PCP: Brigida Denton PA    Date of Admission: 7/7/2025    Chief Complaint:    Chief Complaint   Patient presents with    Toe Pain     Right great toenail coming off, not diabetic, states no signs of infection      Subjective:  No acute events overnight. No acute complaints. Denies chest pain or shortness of breath. Reports foot pain is well controlled.    Medications:  Reviewed    Infusion Medications    sodium chloride 25 mL/hr at 07/10/25 0656     Scheduled Medications    aspirin  81 mg Oral Daily    atorvastatin  40 mg Oral Nightly    piperacillin-tazobactam  3,375 mg IntraVENous Q8H    sodium chloride flush  5-40 mL IntraVENous 2 times per day     PRN Meds: sodium chloride flush, sodium chloride, potassium chloride **OR** potassium alternative oral replacement **OR** potassium chloride, magnesium sulfate, ondansetron **OR** ondansetron, polyethylene glycol, acetaminophen **OR** acetaminophen      Intake/Output Summary (Last 24 hours) at 7/11/2025 0627  Last data filed at 7/10/2025 1658  Gross per 24 hour   Intake 940 ml   Output --   Net 940 ml     Exam:  /67   Pulse 58   Temp 97.5 °F (36.4 °C)   Resp 16   Ht 1.829 m (6')   Wt 57.4 kg (126 lb 8 oz)   SpO2 100%   BMI 17.16 kg/m²   Physical Exam  Cardiovascular:      Rate and Rhythm: Normal rate and regular rhythm.   Pulmonary:      Effort: Pulmonary effort is normal. No respiratory distress.   Abdominal:      Palpations: Abdomen is soft.      Tenderness: There is no abdominal tenderness.   Musculoskeletal:      Comments: RLE dressing c/d/i.   Neurological:      Mental Status: He is alert and oriented to person, place, and time.   Psychiatric:         Mood and Affect: Mood normal.         Behavior: Behavior normal.       Labs:   Recent Labs     07/09/25  0428 07/10/25  0509   WBC 7.1 6.2   HGB 11.9* 11.6*   HCT 33.7* 34.0*    381     Recent Labs     07/09/25  0428 07/10/25  0509 07/10/25  1459    133*  --   no leukocytosis, procal mildly elevated at 0.29, R foot XR findings consistent with underlying osteomyelitis   - podiatry following, s/p partial right 1st toe amputation on 7/8  - ID following, continue zosyn pending further culture results  - wound culture and blood cultures x2 reviewed and NGTD  - cardiology evaluated for PAD, ASA and high intensity statin started, will follow up in outpatient setting    Tobacco use: encouraged cessation     VTE Prophylaxis: pLov    Additional work up or/and treatment plan may be added today or then after based on clinical progression. I am managing a portion of pt care. Some medical issues are handled by other specialists. Additional work up and treatment should be done in out pt setting by pt PCP and other out pt providers.     In addition to examining and evaluating pt, I spent additional time explaining care, normal and abnormal findings, and treatment plan. All of pt questions were answered. Counseling, diet and education were  provided. Case will be discussed with nursing staff when appropriate. Family will be updated if and when appropriate.      Diet: ADULT ORAL NUTRITION SUPPLEMENT; Lunch, Dinner; Wound Healing Oral Supplement  ADULT ORAL NUTRITION SUPPLEMENT; Breakfast; Standard High Calorie/High Protein Oral Supplement  ADULT DIET; Regular    Code Status: Full Code    Electronically signed by Haylie Cowart MD

## 2025-07-11 NOTE — CARE COORDINATION
DISCHARGE PLAN REMAINS HOME W/[facility] BY [facility] AND PO ABX. UPDATED [facility] VIA [facility] PORT.   514- SPOKE W/PT AND HE DENIES HOME GOING NEEDS. HOME ON ORAL ABX. HAS FOLLOW UP PODIATRY APPOINTMENT AND SURGICAL SHOES.

## 2025-07-12 LAB
BACTERIA BLD CULT ORG #2: NORMAL
BACTERIA BLD CULT: NORMAL
CULTURE SURGICAL: ABNORMAL
CULTURE WOUND: ABNORMAL
CULTURE WOUND: ABNORMAL
ORGANISM: ABNORMAL

## 2025-07-14 ENCOUNTER — TELEPHONE (OUTPATIENT)
Age: 70
End: 2025-07-14

## 2025-07-14 LAB
CULTURE SURGICAL: ABNORMAL
ORGANISM: ABNORMAL

## 2025-07-14 NOTE — TELEPHONE ENCOUNTER
Care Transitions Initial Follow Up Call    Outreach made within 2 business days of discharge: Yes    Patient: Richard Holman Patient : 1955   MRN: [id number]  Reason for Admission: Osteomyelitis of great toe of right foot (HCC)   Discharge Date: 25       Spoke with: Number is not in service     Discharge department/facility: [facility]    Scheduled appointment with PCP within 7-14 days    Follow Up  Future Appointments   Date Time Provider Department Center   2025  3:15 PM Arsen Barr DPM [facility]   2025 11:15 AM Irasema Schneider APRN - CNP [facility] [place]   2025  1:00 PM Brigida Denton PA [facility]   2025  2:30 PM Trevon Noyola MD [facility]   2025  9:30 AM Brigida Denton PA [facility]       Faye Tim, MA

## 2025-07-15 NOTE — TELEPHONE ENCOUNTER
Care Transitions Initial Follow Up Call    Outreach made within 2 business days of discharge: Yes    Patient: Richard Holman Patient : 1955   MRN: [id number]  Reason for Admission: Osteomyelitis of great toe of right foot (HCC)   Discharge Date: 25       Spoke with: Number is not in service     Discharge department/facility: [facility]        Scheduled appointment with PCP within 7-14 days    Follow Up  Future Appointments   Date Time Provider Department Center   2025  3:15 PM [facility]   2025 11:15 AM Irasema Schneider APRN - CNP [facility]   2025  1:00 PM Brigida Denton PA [facility]   2025  2:30 PM [facility]   2025  9:30 AM Brigida Denton PA [facility]       Faye Tim, MA

## 2025-07-16 ENCOUNTER — OFFICE VISIT (OUTPATIENT)
Age: 70
End: 2025-07-16

## 2025-07-16 VITALS — WEIGHT: 135 LBS | TEMPERATURE: 96.9 F | BODY MASS INDEX: 18.28 KG/M2 | HEIGHT: 72 IN

## 2025-07-16 DIAGNOSIS — L97.512 ULCER OF RIGHT FOOT WITH FAT LAYER EXPOSED (HCC): ICD-10-CM

## 2025-07-16 DIAGNOSIS — Z89.411 HISTORY OF AMPUTATION OF RIGHT GREAT TOE: ICD-10-CM

## 2025-07-16 DIAGNOSIS — I73.9 PAD (PERIPHERAL ARTERY DISEASE): ICD-10-CM

## 2025-07-16 DIAGNOSIS — M79.671 RIGHT FOOT PAIN: Primary | ICD-10-CM

## 2025-07-16 RX ORDER — GENTAMICIN SULFATE 1 MG/G
CREAM TOPICAL
Qty: 15 G | Refills: 1 | Status: SHIPPED | OUTPATIENT
Start: 2025-07-16

## 2025-07-16 ASSESSMENT — ENCOUNTER SYMPTOMS
VOMITING: 0
BACK PAIN: 0
NAUSEA: 0
SHORTNESS OF BREATH: 0

## 2025-07-16 NOTE — PROGRESS NOTES
[facility] [place] [facility], [facility]  [address] [place] [address]  Dept: [phone]  Loc: [phone]       Richard Hloman  (1955)    7/16/25    Subjective     Richard Holman is 70 y.o. male who complains today of:    Chief Complaint   Patient presents with    Post-Op Check     Right foot       HPI: Patient presents for follow-up status post partial amputation of the right hallux due to osteomyelitis.  Patient also presents for assessment of a skin ulcer to the plantar right foot.  Patient denies any history of diabetes.  Patient states the last time the dressing was applied before he left the hospital.  Patient states he has been wearing the surgical shoe but had to change it and to wear a regular shoe so he can drive a car.  Patient reports compliance with taking oral antibiotics per infectious disease.    Review of Systems   Constitutional:  Negative for chills and fever.   HENT:  Negative for hearing loss.    Respiratory:  Negative for shortness of breath.    Cardiovascular:  Negative for chest pain.   Gastrointestinal:  Negative for nausea and vomiting.   Genitourinary:  Negative for difficulty urinating.   Musculoskeletal:  Negative for back pain and gait problem.   Skin:  Positive for wound.   Neurological:  Negative for numbness.   Hematological:  Does not bruise/bleed easily.   Psychiatric/Behavioral:  Negative for sleep disturbance.        Allergies:  Patient has no known allergies.    Current Outpatient Medications on File Prior to Visit   Medication Sig Dispense Refill    aspirin 81 MG EC tablet Take 1 tablet by mouth daily 30 tablet 0    atorvastatin (LIPITOR) 40 MG tablet Take 1 tablet by mouth nightly 30 tablet 0    amoxicillin-clavulanate (AUGMENTIN) 875-125 MG per tablet Take 1 tablet by mouth 2 times daily for 14 days 28 tablet 0     No current facility-administered medications on file prior to visit.

## 2025-07-17 ENCOUNTER — HOSPITAL ENCOUNTER (OUTPATIENT)
Dept: WOUND CARE | Age: 70
Discharge: HOME | End: 2025-07-17
Payer: MEDICARE

## 2025-07-17 VITALS — RESPIRATION RATE: 16 BRPM | TEMPERATURE: 96.1 F | DIASTOLIC BLOOD PRESSURE: 73 MMHG | SYSTOLIC BLOOD PRESSURE: 124 MMHG

## 2025-07-17 DIAGNOSIS — L97.514 SKIN ULCER OF RIGHT GREAT TOE WITH NECROSIS OF BONE (HCC): Primary | ICD-10-CM

## 2025-07-17 PROCEDURE — 11042 DBRDMT SUBQ TIS 1ST 20SQCM/<: CPT

## 2025-07-17 PROCEDURE — 99213 OFFICE O/P EST LOW 20 MIN: CPT

## 2025-07-17 PROCEDURE — 99203 OFFICE O/P NEW LOW 30 MIN: CPT

## 2025-07-17 RX ORDER — LIDOCAINE HYDROCHLORIDE 20 MG/ML
JELLY TOPICAL PRN
OUTPATIENT
Start: 2025-07-17

## 2025-07-17 RX ORDER — BETAMETHASONE DIPROPIONATE 0.5 MG/G
CREAM TOPICAL PRN
OUTPATIENT
Start: 2025-07-17

## 2025-07-17 RX ORDER — LIDOCAINE HYDROCHLORIDE 40 MG/ML
SOLUTION TOPICAL PRN
OUTPATIENT
Start: 2025-07-17

## 2025-07-17 RX ORDER — LIDOCAINE 50 MG/G
OINTMENT TOPICAL PRN
OUTPATIENT
Start: 2025-07-17

## 2025-07-17 RX ORDER — MAGNESIUM HYDROXIDE 1200 MG/15ML
LIQUID ORAL
Qty: 500 ML | Refills: 3 | Status: SHIPPED | OUTPATIENT
Start: 2025-07-17 | End: 2025-07-24

## 2025-07-17 RX ORDER — GENTAMICIN SULFATE 1 MG/G
OINTMENT TOPICAL PRN
OUTPATIENT
Start: 2025-07-17

## 2025-07-17 RX ORDER — LIDOCAINE 40 MG/G
CREAM TOPICAL PRN
OUTPATIENT
Start: 2025-07-17

## 2025-07-17 RX ORDER — GINSENG 100 MG
CAPSULE ORAL PRN
OUTPATIENT
Start: 2025-07-17

## 2025-07-17 RX ORDER — BACITRACIN ZINC AND POLYMYXIN B SULFATE 500; 1000 [USP'U]/G; [USP'U]/G
OINTMENT TOPICAL PRN
OUTPATIENT
Start: 2025-07-17

## 2025-07-17 RX ORDER — SILVER SULFADIAZINE 10 MG/G
CREAM TOPICAL PRN
OUTPATIENT
Start: 2025-07-17

## 2025-07-17 RX ORDER — MUPIROCIN 2 %
OINTMENT (GRAM) TOPICAL PRN
OUTPATIENT
Start: 2025-07-17

## 2025-07-17 RX ORDER — SODIUM CHLOR/HYPOCHLOROUS ACID 0.033 %
SOLUTION, IRRIGATION IRRIGATION PRN
OUTPATIENT
Start: 2025-07-17

## 2025-07-17 RX ORDER — CLOBETASOL PROPIONATE 0.5 MG/G
OINTMENT TOPICAL PRN
OUTPATIENT
Start: 2025-07-17

## 2025-07-17 RX ORDER — NEOMYCIN/BACITRACIN/POLYMYXINB 3.5-400-5K
OINTMENT (GRAM) TOPICAL PRN
OUTPATIENT
Start: 2025-07-17

## 2025-07-17 RX ORDER — TRIAMCINOLONE ACETONIDE 1 MG/G
OINTMENT TOPICAL PRN
OUTPATIENT
Start: 2025-07-17

## 2025-07-17 NOTE — DISCHARGE INSTRUCTIONS
[facility]   Physician Orders and Discharge Instructions  [address] [place]  [address]  Telephone: [phone]      NAME:  Richard Holman          YOB: 1955  MEDICAL RECORD NUMBER:  [id number]    Your  is:  Liliana    Home Care/Facility: None    Wound Location: Right Great toe Incision and Right Plantar Foot    Dressing orders: Cleanse with Normal Saline  Apply a thin layer of Gentamicin ointment to areas  Cover with a dry dressing  Change Daily    Compression: None    Offloading Device: Forefoot off-loading shoe    Other Instructions: .Please  your prescription Gentamicin ointment and normal saline at your pharmacy and take/use as prescribed.    Keep all dressings clean, dry and intact.  Keep pressure off the wound(s) at all times.     Follow up visit   2 Weeks July 31, 2025 @ 10:15    Please give 24 hour notice if unable to keep appointment. [phone]    If you experience any of the following, please call the Wound Care Service at  [phone] or go to the nearest emergency room.   *Increase in pain *Temperature over 101 *Increase in drainage from your wound or a foul odor  *Uncontrolled swelling *Need for compression bandage changes due to slippage, breakthrough drainage       PLEASE NOTE: IF YOU ARE UNABLE TO OBTAIN WOUND SUPPLIES, CONTINUE TO USE THE SUPPLIES YOU HAVE AVAILABLE UNTIL YOU ARE ABLE TO REACH US. IT IS MOST IMPORTANT TO KEEP THE WOUND COVERED AT ALL TIMES          Electronically signed by EMILIA Clark CNP on 7/17/2025 at 12:03 PM

## 2025-07-17 NOTE — PROGRESS NOTES
[facility]                                                   Progress Note and Procedure Note      Richard Holman  MEDICAL RECORD NUMBER:  [id number]  AGE: 70 y.o.   GENDER: male  : 1955  EPISODE DATE:  2025    Subjective:     Chief Complaint   Patient presents with    Wound Check         HISTORY of PRESENT ILLNESS HPI     Richard Holman is a 70 y.o. male who presents today for wound/ulcer evaluation.   History of Wound Context: S/p parital amputation of right hallux due to osteomyelitis. Also has ulcer to right plantar foot. Presented to ED with toe pain and was found to have ulcer with OM. Patient does not have a history of diabetes. Patient reports compliance with antibiotics and offloading. He states he has to go  gentamicin from pharmacy today. Sutures intact. Denies fever, chills, nausea, vomiting.   Wound/Ulcer Pain Timing/Severity: none  Quality of pain: N/A  Severity:  0 / 10   Modifying Factors: None  Associated Signs/Symptoms: none    Ulcer Identification:  Ulcer Type: undetermined  Contributing Factors: shear force          PAST MEDICAL HISTORY    History reviewed. No pertinent past medical history.    PAST SURGICAL HISTORY    Past Surgical History:   Procedure Laterality Date    TOE AMPUTATION Right 2025    PARTIAL HALLUX AMPUTATION RIGHT FOOT ROOM 467 performed [place] Ar[place] Barr DPM [place] [facility]       FAMILY HISTORY    Family History   Problem Relation Age of Onset    Stroke Mother     Cancer Father        SOCIAL HISTORY    Social History     Tobacco Use    Smoking status: Every Day     Current packs/day: 0.25     Average packs/day: 0.3 packs/day for 45.0 years (11.3 ttl pk-yrs)     Types: Cigarettes    Smokeless tobacco: Never   Substance Use Topics    Alcohol use: Not Currently    Drug use: Never       ALLERGIES    No Known Allergies    MEDICATIONS    Current Outpatient Medications on File Prior to Encounter   Medication Sig Dispense Refill    gentamicin

## 2025-07-19 LAB
ATRIAL RATE: 300 BPM
ATRIAL RATE: 81 BPM
P AXIS: 75 DEGREES
P OFFSET: 195 MS
P ONSET: 146 MS
PR INTERVAL: 124 MS
Q ONSET: 208 MS
Q ONSET: 226 MS
QRS COUNT: 14 BEATS
QRS COUNT: 14 BEATS
QRS DURATION: 74 MS
QRS DURATION: 82 MS
QT INTERVAL: 348 MS
QT INTERVAL: 370 MS
QTC CALCULATION(BAZETT): 418 MS
QTC CALCULATION(BAZETT): 429 MS
QTC FREDERICIA: 393 MS
QTC FREDERICIA: 408 MS
R AXIS: 39 DEGREES
R AXIS: 7 DEGREES
T AXIS: 38 DEGREES
T AXIS: 68 DEGREES
T OFFSET: 393 MS
T OFFSET: 400 MS
VENTRICULAR RATE: 81 BPM
VENTRICULAR RATE: 87 BPM

## 2025-07-21 NOTE — PROGRESS NOTES
Post-Discharge Transitional Care Follow Up      Richard Holman   YOB: 1955    Date of Office Visit:  7/23/2025  Date of Hospital Admission: 7/7/25  Date of Hospital Discharge: 7/11/25  Readmission Risk Score (high >=14%. Medium >=10%):Readmission Risk Score: 10.6      Care management risk score Rising risk (score 2-5) and Complex Care (Scores >=6): No Risk Score On File     Non face to face  following discharge, date last encounter closed (first attempt may have been earlier): 07/14/2025     Call initiated 2 business days of discharge: Yes     Hyperlipidemia, unspecified hyperlipidemia type  -     atorvastatin (LIPITOR) 40 MG tablet; Take 1 tablet by mouth nightly, Disp-30 tablet, R-0Normal  PAD (peripheral artery disease)  -     atorvastatin (LIPITOR) 40 MG tablet; Take 1 tablet by mouth nightly, Disp-30 tablet, R-0Normal  -     aspirin 81 MG EC tablet; Take 1 tablet by mouth daily, Disp-30 tablet, R-0Normal  Osteomyelitis of great toe of right foot (HCC)  Cigarette nicotine dependence without complication  -     nicotine (NICODERM CQ) 21 MG/24HR; Place 1 patch onto the skin daily, Disp-42 patch, R-1Normal    Assessment & Plan  1. Partial amputation of the big toe.  - The primary cause of the toe loss was identified as poor circulation in the foot.  - Currently on Augmentin twice a day for 14 days with a few more days left on this course.  - Started on Lipitor and baby aspirin, which should be continued lifelong to prevent further cholesterol buildup and ensure proper circulation.  - Referral to Dr. Benavides, a cardiologist, for further evaluation and potential referral to a vascular specialist. The possibility of stent placement to improve blood flow was discussed.    2. Smoking cessation.  - Smokes about half a pack a day but does not smoke every day.  - Smoking cessation is crucial to prevent further arterial damage and promote wound healing.  - Prescription for a 21 mg nicotine patch will be

## 2025-07-23 ENCOUNTER — OFFICE VISIT (OUTPATIENT)
Age: 70
End: 2025-07-23

## 2025-07-23 VITALS
WEIGHT: 125.4 LBS | HEIGHT: 72 IN | BODY MASS INDEX: 16.98 KG/M2 | DIASTOLIC BLOOD PRESSURE: 60 MMHG | SYSTOLIC BLOOD PRESSURE: 98 MMHG | RESPIRATION RATE: 16 BRPM | OXYGEN SATURATION: 100 % | HEART RATE: 74 BPM

## 2025-07-23 DIAGNOSIS — I73.9 PAD (PERIPHERAL ARTERY DISEASE): ICD-10-CM

## 2025-07-23 DIAGNOSIS — E78.5 HYPERLIPIDEMIA, UNSPECIFIED HYPERLIPIDEMIA TYPE: Primary | ICD-10-CM

## 2025-07-23 DIAGNOSIS — Z09 HOSPITAL DISCHARGE FOLLOW-UP: ICD-10-CM

## 2025-07-23 DIAGNOSIS — M86.9 OSTEOMYELITIS OF GREAT TOE OF RIGHT FOOT (HCC): ICD-10-CM

## 2025-07-23 DIAGNOSIS — F17.210 CIGARETTE NICOTINE DEPENDENCE WITHOUT COMPLICATION: ICD-10-CM

## 2025-07-23 RX ORDER — ATORVASTATIN CALCIUM 40 MG/1
40 TABLET, FILM COATED ORAL NIGHTLY
Qty: 90 TABLET | Refills: 1 | Status: SHIPPED | OUTPATIENT
Start: 2025-07-23

## 2025-07-23 RX ORDER — NICOTINE 21 MG/24HR
1 PATCH, TRANSDERMAL 24 HOURS TRANSDERMAL DAILY
Qty: 42 PATCH | Refills: 1 | Status: SHIPPED | OUTPATIENT
Start: 2025-07-23

## 2025-07-23 RX ORDER — ASPIRIN 81 MG/1
81 TABLET ORAL DAILY
Qty: 30 TABLET | Refills: 0 | Status: SHIPPED | OUTPATIENT
Start: 2025-07-23 | End: 2025-07-23 | Stop reason: SDUPTHER

## 2025-07-23 RX ORDER — ASPIRIN 81 MG/1
81 TABLET ORAL DAILY
Qty: 90 TABLET | Refills: 1 | Status: SHIPPED | OUTPATIENT
Start: 2025-07-23

## 2025-07-23 RX ORDER — ATORVASTATIN CALCIUM 40 MG/1
40 TABLET, FILM COATED ORAL NIGHTLY
Qty: 30 TABLET | Refills: 0 | Status: SHIPPED | OUTPATIENT
Start: 2025-07-23 | End: 2025-07-23 | Stop reason: SDUPTHER

## 2025-07-23 ASSESSMENT — ENCOUNTER SYMPTOMS
ABDOMINAL PAIN: 0
BACK PAIN: 0
SHORTNESS OF BREATH: 0
DIARRHEA: 0
SORE THROAT: 0
SINUS PRESSURE: 0
VOMITING: 0
COUGH: 0
SINUS PAIN: 0
NAUSEA: 0
CHEST TIGHTNESS: 0

## 2025-07-23 ASSESSMENT — VISUAL ACUITY: OU: 1

## (undated) DEVICE — ZIMMER® STERILE DISPOSABLE TOURNIQUET CUFF WITH PLC, DUAL PORT, SINGLE BLADDER, 18 IN. (46 CM)

## (undated) DEVICE — NEPTUNE E-SEP SMOKE EVACUATION PENCIL, COATED, 70MM BLADE, PUSH BUTTON SWITCH: Brand: NEPTUNE E-SEP

## (undated) DEVICE — GAUZE,SPONGE,4"X4",4PLY,STRL,LF: Brand: MEDLINE

## (undated) DEVICE — SUTURE ABSRB X-1 REV CUT 1/2 CIR 22MM UD BRAID 27IN SZ 3-0 J458H

## (undated) DEVICE — 1010 S-DRAPE TOWEL DRAPE 10/BX: Brand: STERI-DRAPE™

## (undated) DEVICE — BLADE,CARBON-STEEL,15,STRL,DISPOSABLE,TB: Brand: MEDLINE

## (undated) DEVICE — SUTURE PROL SZ 2-0 L18IN NONABSORBABLE BLU FS L26MM 3/8 CIR 8685H

## (undated) DEVICE — SOLUTION PREP PAINT POV IOD FOR SKIN MUCOUS MEM

## (undated) DEVICE — GOWN,SIRUS,NONRNF,SETINSLV,2XL,18/CS: Brand: MEDLINE

## (undated) DEVICE — LOWER EXTREMITY: Brand: MEDLINE INDUSTRIES, INC.

## (undated) DEVICE — 4-PORT MANIFOLD: Brand: NEPTUNE 2

## (undated) DEVICE — DRESSING,GAUZE,OIL EMLSN,CURAD,3X8",1/PK: Brand: CURAD

## (undated) DEVICE — GLOVE ORANGE PI 8   MSG9080